# Patient Record
Sex: FEMALE | Race: WHITE | Employment: OTHER | URBAN - NONMETROPOLITAN AREA
[De-identification: names, ages, dates, MRNs, and addresses within clinical notes are randomized per-mention and may not be internally consistent; named-entity substitution may affect disease eponyms.]

---

## 2020-06-08 ENCOUNTER — TELEPHONE (OUTPATIENT)
Dept: ADMINISTRATIVE | Age: 65
End: 2020-06-08

## 2020-06-11 ENCOUNTER — VIRTUAL VISIT (OUTPATIENT)
Dept: FAMILY MEDICINE CLINIC | Age: 65
End: 2020-06-11
Payer: MEDICARE

## 2020-06-11 PROCEDURE — 4040F PNEUMOC VAC/ADMIN/RCVD: CPT | Performed by: CLINICAL NURSE SPECIALIST

## 2020-06-11 PROCEDURE — 3017F COLORECTAL CA SCREEN DOC REV: CPT | Performed by: CLINICAL NURSE SPECIALIST

## 2020-06-11 PROCEDURE — G8427 DOCREV CUR MEDS BY ELIG CLIN: HCPCS | Performed by: CLINICAL NURSE SPECIALIST

## 2020-06-11 PROCEDURE — 1123F ACP DISCUSS/DSCN MKR DOCD: CPT | Performed by: CLINICAL NURSE SPECIALIST

## 2020-06-11 PROCEDURE — 1090F PRES/ABSN URINE INCON ASSESS: CPT | Performed by: CLINICAL NURSE SPECIALIST

## 2020-06-11 PROCEDURE — G8400 PT W/DXA NO RESULTS DOC: HCPCS | Performed by: CLINICAL NURSE SPECIALIST

## 2020-06-11 PROCEDURE — 99203 OFFICE O/P NEW LOW 30 MIN: CPT | Performed by: CLINICAL NURSE SPECIALIST

## 2020-06-11 RX ORDER — ASPIRIN 81 MG/1
81 TABLET ORAL AS NEEDED
COMMUNITY

## 2020-06-11 RX ORDER — ESCITALOPRAM OXALATE 20 MG/1
20 TABLET ORAL DAILY
COMMUNITY
End: 2020-08-11 | Stop reason: SDUPTHER

## 2020-06-11 RX ORDER — NITROFURANTOIN MACROCRYSTALS 50 MG/1
50 CAPSULE ORAL PRN
COMMUNITY

## 2020-06-11 RX ORDER — ALENDRONATE SODIUM 70 MG/1
70 TABLET ORAL
COMMUNITY
End: 2020-08-11 | Stop reason: SDUPTHER

## 2020-06-11 ASSESSMENT — ENCOUNTER SYMPTOMS
EYE REDNESS: 0
SORE THROAT: 0
CHEST TIGHTNESS: 0
SHORTNESS OF BREATH: 0
EYE DISCHARGE: 0
DIARRHEA: 0
CONSTIPATION: 0
TROUBLE SWALLOWING: 0
VOMITING: 0
SINUS PRESSURE: 0
ABDOMINAL PAIN: 0
WHEEZING: 0
ABDOMINAL DISTENTION: 0
EYE PAIN: 0
BACK PAIN: 1
COUGH: 0
NAUSEA: 0
FACIAL SWELLING: 0
COLOR CHANGE: 0

## 2020-06-11 NOTE — PROGRESS NOTES
Gastrointestinal: Negative for abdominal distention, abdominal pain, constipation, diarrhea, nausea and vomiting. Endocrine: Negative for cold intolerance and heat intolerance. Genitourinary: Negative for difficulty urinating, dysuria, flank pain, frequency, hematuria and urgency. Musculoskeletal: Positive for arthralgias and back pain. Negative for joint swelling, neck pain and neck stiffness. Skin: Negative for color change and rash. Neurological: Negative for dizziness, syncope, weakness, light-headedness and headaches. Hematological: Negative for adenopathy. Does not bruise/bleed easily. Psychiatric/Behavioral: Negative for confusion, dysphoric mood and suicidal ideas. The patient is nervous/anxious. OBJECTIVE:  There were no vitals taken for this visit. Physical Exam  Vitals signs reviewed. Constitutional:       General: She is not in acute distress. Appearance: Normal appearance. She is normal weight. She is not ill-appearing or toxic-appearing. HENT:      Head: Normocephalic. Nose: Nose normal. No congestion. Neck:      Musculoskeletal: Normal range of motion. Pulmonary:      Effort: Pulmonary effort is normal. No respiratory distress. Breath sounds: No wheezing. Neurological:      General: No focal deficit present. Mental Status: She is alert and oriented to person, place, and time. Mental status is at baseline. Psychiatric:         Mood and Affect: Mood normal.         Behavior: Behavior normal.         Thought Content: Thought content normal.         Judgment: Judgment normal.         ASSESSMENT/PLAN:  1. Hyperlipidemia, unspecified hyperlipidemia type  - Lipid Panel; Future    2. Osteopenia, unspecified location  - Vitamin D 25 Hydroxy; Future    3. MANOLO (generalized anxiety disorder)    4. Arthralgia, unspecified joint    5. Fatigue, unspecified type  - CBC Auto Differential; Future  - Comprehensive Metabolic Panel; Future  - Ferritin;  Future  -

## 2020-06-12 ENCOUNTER — TELEPHONE (OUTPATIENT)
Dept: FAMILY MEDICINE CLINIC | Age: 65
End: 2020-06-12

## 2020-06-12 ENCOUNTER — TRANSCRIBE ORDERS (OUTPATIENT)
Dept: ADMINISTRATIVE | Facility: HOSPITAL | Age: 65
End: 2020-06-12

## 2020-06-12 DIAGNOSIS — Z12.31 ENCOUNTER FOR SCREENING MAMMOGRAM FOR MALIGNANT NEOPLASM OF BREAST: Primary | ICD-10-CM

## 2020-06-17 ENCOUNTER — TELEPHONE (OUTPATIENT)
Dept: FAMILY MEDICINE CLINIC | Age: 65
End: 2020-06-17

## 2020-06-22 LAB
BASOPHILS ABSOLUTE: NORMAL
BASOPHILS RELATIVE PERCENT: NORMAL
EOSINOPHILS ABSOLUTE: NORMAL
EOSINOPHILS RELATIVE PERCENT: NORMAL
HCT VFR BLD CALC: NORMAL %
HEMOGLOBIN: NORMAL
IRON: NORMAL
LYMPHOCYTES ABSOLUTE: NORMAL
LYMPHOCYTES RELATIVE PERCENT: NORMAL
MCH RBC QN AUTO: NORMAL PG
MCHC RBC AUTO-ENTMCNC: NORMAL G/DL
MCV RBC AUTO: NORMAL FL
MONOCYTES ABSOLUTE: NORMAL
MONOCYTES RELATIVE PERCENT: NORMAL
NEUTROPHILS ABSOLUTE: NORMAL
NEUTROPHILS RELATIVE PERCENT: NORMAL
PDW BLD-RTO: NORMAL %
PLATELET # BLD: NORMAL 10*3/UL
PMV BLD AUTO: NORMAL FL
RBC # BLD: NORMAL 10*6/UL
VITAMIN B-12: NORMAL
VITAMIN D 25-HYDROXY: NORMAL
VITAMIN D2, 25 HYDROXY: NORMAL
VITAMIN D3,25 HYDROXY: NORMAL
WBC # BLD: NORMAL 10*3/UL

## 2020-06-23 LAB — FERRITIN: NORMAL

## 2020-06-26 ENCOUNTER — TELEPHONE (OUTPATIENT)
Dept: FAMILY MEDICINE CLINIC | Age: 65
End: 2020-06-26

## 2020-06-26 NOTE — TELEPHONE ENCOUNTER
Janet called to reschedule a ov with pittaalexander, needs to change to fri. Please be advised that the best time to call her to accommodate their needs is Anytime. Thank you.

## 2020-07-16 ENCOUNTER — OFFICE VISIT (OUTPATIENT)
Dept: FAMILY MEDICINE CLINIC | Age: 65
End: 2020-07-16
Payer: MEDICARE

## 2020-07-16 VITALS
SYSTOLIC BLOOD PRESSURE: 118 MMHG | OXYGEN SATURATION: 98 % | HEIGHT: 66 IN | BODY MASS INDEX: 18.96 KG/M2 | DIASTOLIC BLOOD PRESSURE: 70 MMHG | HEART RATE: 69 BPM | WEIGHT: 118 LBS | TEMPERATURE: 98.9 F

## 2020-07-16 PROBLEM — D64.9 ANEMIA: Status: ACTIVE | Noted: 2020-07-16

## 2020-07-16 LAB — HGB, POC: 11.4

## 2020-07-16 PROCEDURE — 85018 HEMOGLOBIN: CPT | Performed by: CLINICAL NURSE SPECIALIST

## 2020-07-16 PROCEDURE — G8400 PT W/DXA NO RESULTS DOC: HCPCS | Performed by: CLINICAL NURSE SPECIALIST

## 2020-07-16 PROCEDURE — 4040F PNEUMOC VAC/ADMIN/RCVD: CPT | Performed by: CLINICAL NURSE SPECIALIST

## 2020-07-16 PROCEDURE — G8427 DOCREV CUR MEDS BY ELIG CLIN: HCPCS | Performed by: CLINICAL NURSE SPECIALIST

## 2020-07-16 PROCEDURE — 1036F TOBACCO NON-USER: CPT | Performed by: CLINICAL NURSE SPECIALIST

## 2020-07-16 PROCEDURE — 1090F PRES/ABSN URINE INCON ASSESS: CPT | Performed by: CLINICAL NURSE SPECIALIST

## 2020-07-16 PROCEDURE — 1123F ACP DISCUSS/DSCN MKR DOCD: CPT | Performed by: CLINICAL NURSE SPECIALIST

## 2020-07-16 PROCEDURE — 3017F COLORECTAL CA SCREEN DOC REV: CPT | Performed by: CLINICAL NURSE SPECIALIST

## 2020-07-16 PROCEDURE — 99213 OFFICE O/P EST LOW 20 MIN: CPT | Performed by: CLINICAL NURSE SPECIALIST

## 2020-07-16 PROCEDURE — G8420 CALC BMI NORM PARAMETERS: HCPCS | Performed by: CLINICAL NURSE SPECIALIST

## 2020-07-16 NOTE — PROGRESS NOTES
SUBJECTIVE:  Pete Gaona is a 72 y.o. who presents today for Follow-up and Discuss Labs      HPI    Janet presents in office today for follow up. She established care with me recently, mentioned some chronic recurrent fatigue. She seems to have had waves of fatigue for years. Currently, she will have days with plenty of energy but other days she feels like she could lay her head down and just sleep all day. Lab work up showed normal thyroid and normal iron, b12 levels. Her hgb was slightly low at 11.7 and it was 12 in December. Since Dec, she has changed her diet due to high cholesterol. She has lost some expected weight, but has lost another 4lb since May. She has not had colonoscopy, but is scheduled in September. There is no chest pain. She mentioned some thorax pain at last visit, but that has resolved. No exertional fatigue noted. Hemoglobin today is 11.4    Past Medical History:   Diagnosis Date    Arthralgia     MANOLO (generalized anxiety disorder)     MANOLO (generalized anxiety disorder)     Hyperlipidemia     Osteopenia      Past Surgical History:   Procedure Laterality Date    HYSTERECTOMY  2010    LUMBAR DISCECTOMY  2015    LUMBAR LAMINECTOMY  2014    OVARIAN CYST REMOVAL  1978     Family History   Problem Relation Age of Onset    Coronary Art Dis Mother     Prostate Cancer Father     Coronary Art Dis Father     Diabetes type 2  Father     Arthritis Sister     Glaucoma Sister      Social History     Tobacco Use    Smoking status: Never Smoker    Smokeless tobacco: Never Used   Substance Use Topics    Alcohol use:  Yes     Alcohol/week: 2.0 standard drinks     Types: 2 Glasses of wine per week     Current Outpatient Medications   Medication Sig Dispense Refill    escitalopram (LEXAPRO) 20 MG tablet Take 20 mg by mouth daily      alendronate (FOSAMAX) 70 MG tablet Take 70 mg by mouth every 7 days      nitrofurantoin (MACRODANTIN) 50 MG capsule Take 50 mg by mouth as needed      Emollient (RA MOISTURIZING CREAM EX) Apply 1 Dose topically daily      Multiple Vitamins-Minerals (CENTRUM SILVER 50+WOMEN PO) Take 1 tablet by mouth daily      vitamin D (CHOLECALCIFEROL) 25 MCG (1000 UT) TABS tablet Take 1,000 Units by mouth daily      Misc Natural Products (GLUCOSAMINE CHOND COMPLEX/MSM PO) Take 1 tablet by mouth 2 times daily      aspirin 81 MG EC tablet Take 81 mg by mouth daily      LUTEIN PO Take 1 tablet by mouth daily      Hyaluronic Acid-Vitamin C (HYALURONIC ACID PO) Take 1 tablet by mouth daily       No current facility-administered medications for this visit. No Known Allergies    Review of Systems   Constitutional: Positive for fatigue and unexpected weight change. Negative for appetite change, chills, diaphoresis and fever. HENT: Negative for congestion, ear pain, facial swelling, hearing loss, postnasal drip, sinus pressure, sore throat and trouble swallowing. Eyes: Negative for pain, discharge, redness and visual disturbance. Respiratory: Negative for cough, chest tightness, shortness of breath and wheezing. Cardiovascular: Negative for chest pain and palpitations. Gastrointestinal: Negative for abdominal pain, anal bleeding, blood in stool, constipation and diarrhea. Endocrine: Negative for cold intolerance and heat intolerance. Genitourinary: Negative for difficulty urinating, dysuria, flank pain, frequency, hematuria and urgency. Musculoskeletal: Positive for arthralgias. Negative for back pain, joint swelling and neck pain. Skin: Negative for color change and rash. Neurological: Negative for dizziness, syncope, weakness, light-headedness and headaches. Hematological: Negative for adenopathy. Does not bruise/bleed easily. Psychiatric/Behavioral: Negative for confusion, dysphoric mood and suicidal ideas. The patient is not nervous/anxious.          OBJECTIVE:  /70   Pulse 69   Temp 98.9 °F (37.2 °C) (Temporal)   Ht 5' 5.5\" (1.664 m)   Wt 118 lb (53.5 kg)   SpO2 98%   BMI 19.34 kg/m²    Physical Exam  Vitals signs reviewed. Constitutional:       General: She is not in acute distress. Appearance: Normal appearance. She is well-developed. She is not ill-appearing or toxic-appearing. HENT:      Head: Normocephalic and atraumatic. Eyes:      General:         Right eye: No discharge. Left eye: No discharge. Conjunctiva/sclera: Conjunctivae normal.      Pupils: Pupils are equal, round, and reactive to light. Neck:      Musculoskeletal: Normal range of motion and neck supple. Thyroid: No thyromegaly. Trachea: No tracheal deviation. Cardiovascular:      Rate and Rhythm: Normal rate and regular rhythm. Heart sounds: No murmur. Pulmonary:      Effort: Pulmonary effort is normal. No respiratory distress. Breath sounds: Normal breath sounds. No wheezing or rales. Abdominal:      General: Bowel sounds are normal. There is no distension. Palpations: Abdomen is soft. Tenderness: There is no abdominal tenderness. There is no rebound. Genitourinary:     Vagina: Normal. No vaginal discharge. Musculoskeletal: Normal range of motion. General: No deformity. Lymphadenopathy:      Cervical: No cervical adenopathy. Skin:     General: Skin is warm and dry. Findings: No erythema or rash. Neurological:      General: No focal deficit present. Mental Status: She is alert and oriented to person, place, and time. Mental status is at baseline. Motor: No weakness. Psychiatric:         Mood and Affect: Mood normal.         Behavior: Behavior normal.         Thought Content: Thought content normal.         Judgment: Judgment normal.         ASSESSMENT/PLAN:  1. Chronic fatigue    2. Anemia, unspecified type  - POCT hemoglobin  - CBC Auto Differential; Future    Work up for fatigue negative other than a mild anemia but normal iron, b12 and ferritin.   Recommend she add iron to her MVI once daily with food.  hgb slightly lower today. Needs the colonoscopy as planned. If worsening fatigue, get CBC between now and colonoscopy. Also recommend she monitor blood pressure and heart rate on days she is symptomatic. If any exertional symptoms or pain, needs cardiac work up. Return in about 3 months (around 10/16/2020) for Annual Wellness Visit.

## 2020-07-17 ENCOUNTER — HOSPITAL ENCOUNTER (OUTPATIENT)
Dept: MAMMOGRAPHY | Facility: HOSPITAL | Age: 65
Discharge: HOME | End: 2020-07-17
Admitting: CLINICAL NURSE SPECIALIST

## 2020-07-17 DIAGNOSIS — Z12.31 ENCOUNTER FOR SCREENING MAMMOGRAM FOR MALIGNANT NEOPLASM OF BREAST: ICD-10-CM

## 2020-07-17 PROCEDURE — 77067 SCR MAMMO BI INCL CAD: CPT

## 2020-07-17 PROCEDURE — 77063 BREAST TOMOSYNTHESIS BI: CPT

## 2020-07-17 ASSESSMENT — ENCOUNTER SYMPTOMS
SHORTNESS OF BREATH: 0
TROUBLE SWALLOWING: 0
FACIAL SWELLING: 0
BLOOD IN STOOL: 0
COLOR CHANGE: 0
ABDOMINAL PAIN: 0
COUGH: 0
EYE PAIN: 0
CHEST TIGHTNESS: 0
CONSTIPATION: 0
EYE DISCHARGE: 0
BACK PAIN: 0
EYE REDNESS: 0
DIARRHEA: 0
ANAL BLEEDING: 0
SORE THROAT: 0
WHEEZING: 0
SINUS PRESSURE: 0

## 2020-07-22 DIAGNOSIS — Z12.31 ENCOUNTER FOR SCREENING MAMMOGRAM FOR MALIGNANT NEOPLASM OF BREAST: ICD-10-CM

## 2020-07-23 ENCOUNTER — TELEPHONE (OUTPATIENT)
Dept: FAMILY MEDICINE CLINIC | Age: 65
End: 2020-07-23

## 2020-07-23 NOTE — TELEPHONE ENCOUNTER
Patient called again this morning anxiously awaiting her test results. Please call her @ [phone]. She left a message yesterday with Violet Andrade  Thanks

## 2020-08-04 ENCOUNTER — TELEPHONE (OUTPATIENT)
Dept: FAMILY MEDICINE CLINIC | Age: 65
End: 2020-08-04

## 2020-08-04 NOTE — TELEPHONE ENCOUNTER
She should be ok as long as not using the ointment on the day of testing. They also can sometimes do a throat test, but the nasal swab actually goes back into the nasopharynx so should bypass any area ointment may have been. Recommend flu clinic for COVID testing if symptomatic.

## 2020-08-18 ENCOUNTER — TELEPHONE (OUTPATIENT)
Dept: ADMINISTRATIVE | Age: 65
End: 2020-08-18

## 2020-08-18 RX ORDER — ALENDRONATE SODIUM 70 MG/1
70 TABLET ORAL
Qty: 12 TABLET | Refills: 3 | Status: SHIPPED | OUTPATIENT
Start: 2020-08-18 | End: 2021-06-29 | Stop reason: SDUPTHER

## 2020-08-18 RX ORDER — ESCITALOPRAM OXALATE 20 MG/1
20 TABLET ORAL DAILY
Qty: 90 TABLET | Refills: 3 | Status: SHIPPED | OUTPATIENT
Start: 2020-08-18 | End: 2021-05-21 | Stop reason: SDUPTHER

## 2020-08-18 NOTE — TELEPHONE ENCOUNTER
Patient requested to have her scripts resent to [facility] instead of the [facility] in [place]. Scripts sent.

## 2020-08-18 NOTE — TELEPHONE ENCOUNTER
Janet called stating that their medication send to a different pharmacy Please call her to advise. Prescriptions was sent to wrong pharmacy.  Pt wants prescriptions sent to the [facility] in [facility]:  Visit date not found  Next Appt:   Visit date not found  Preferred Pharmacy: [facility] in [place]

## 2020-09-20 ENCOUNTER — OFFICE VISIT (OUTPATIENT)
Age: 65
End: 2020-09-20

## 2020-09-20 VITALS — TEMPERATURE: 98 F | HEART RATE: 70 BPM | OXYGEN SATURATION: 98 %

## 2020-09-20 PROCEDURE — 99999 PR OFFICE/OUTPT VISIT,PROCEDURE ONLY: CPT | Performed by: PHYSICIAN ASSISTANT

## 2020-09-22 LAB — SARS-COV-2, NAA: NOT DETECTED

## 2020-09-23 ENCOUNTER — ANESTHESIA EVENT (OUTPATIENT)
Dept: OPERATING ROOM | Age: 65
End: 2020-09-23

## 2020-09-24 ENCOUNTER — HOSPITAL ENCOUNTER (OUTPATIENT)
Age: 65
Setting detail: OUTPATIENT SURGERY
Discharge: HOME | End: 2020-09-24
Attending: INTERNAL MEDICINE | Admitting: INTERNAL MEDICINE
Payer: MEDICARE

## 2020-09-24 ENCOUNTER — ANESTHESIA (OUTPATIENT)
Dept: OPERATING ROOM | Age: 65
End: 2020-09-24

## 2020-09-24 ENCOUNTER — APPOINTMENT (OUTPATIENT)
Dept: OPERATING ROOM | Age: 65
End: 2020-09-24

## 2020-09-24 VITALS
HEIGHT: 66 IN | RESPIRATION RATE: 20 BRPM | SYSTOLIC BLOOD PRESSURE: 90 MMHG | HEART RATE: 59 BPM | WEIGHT: 122 LBS | BODY MASS INDEX: 19.61 KG/M2 | TEMPERATURE: 97.1 F | DIASTOLIC BLOOD PRESSURE: 48 MMHG | OXYGEN SATURATION: 98 %

## 2020-09-24 VITALS — DIASTOLIC BLOOD PRESSURE: 49 MMHG | SYSTOLIC BLOOD PRESSURE: 92 MMHG | OXYGEN SATURATION: 99 %

## 2020-09-24 PROCEDURE — G8907 PT DOC NO EVENTS ON DISCHARG: HCPCS

## 2020-09-24 PROCEDURE — G8918 PT W/O PREOP ORDER IV AB PRO: HCPCS

## 2020-09-24 PROCEDURE — G0105 COLORECTAL SCRN; HI RISK IND: HCPCS

## 2020-09-24 PROCEDURE — G0105 COLORECTAL SCRN; HI RISK IND: HCPCS | Performed by: INTERNAL MEDICINE

## 2020-09-24 RX ORDER — LIDOCAINE HYDROCHLORIDE 10 MG/ML
INJECTION, SOLUTION INFILTRATION; PERINEURAL PRN
Status: DISCONTINUED | OUTPATIENT
Start: 2020-09-24 | End: 2020-09-24 | Stop reason: SDUPTHER

## 2020-09-24 RX ORDER — PROPOFOL 10 MG/ML
INJECTION, EMULSION INTRAVENOUS PRN
Status: DISCONTINUED | OUTPATIENT
Start: 2020-09-24 | End: 2020-09-24 | Stop reason: SDUPTHER

## 2020-09-24 RX ORDER — SODIUM CHLORIDE 9 MG/ML
INJECTION, SOLUTION INTRAVENOUS CONTINUOUS
Status: DISCONTINUED | OUTPATIENT
Start: 2020-09-24 | End: 2020-09-24 | Stop reason: HOSPADM

## 2020-09-24 RX ADMIN — LIDOCAINE HYDROCHLORIDE 40 MG: 10 INJECTION, SOLUTION INFILTRATION; PERINEURAL at 09:05

## 2020-09-24 RX ADMIN — SODIUM CHLORIDE: 9 INJECTION, SOLUTION INTRAVENOUS at 08:11

## 2020-09-24 RX ADMIN — PROPOFOL 230 MG: 10 INJECTION, EMULSION INTRAVENOUS at 09:05

## 2020-09-24 NOTE — H&P
Patient Name: French Pina  : 1955  MRN: [id number]  DATE: 20    Allergies: No Known Allergies     ENDOSCOPY  History and Physical    Procedure:    [] Diagnostic Colonoscopy       [x] Screening Colonoscopy  [] EGD      [] ERCP      [] EUS       [] Other    [x] Previous office notes/History and Physical reviewed from the patients chart. Please see EMR for further details of HPI. I have examined the patient's status immediately prior to the procedure and:      Indications/HPI:       [x] Screening              [] History of Polyps      [x]Fhx of colon CA/polyps       Anesthesia:   [x] MAC [] Moderate Sedation   [] General   [] None     ROS: 12 pt review of systems was negative unless stated above    Medications:   Prior to Admission medications    Medication Sig Start Date End Date Taking?  Authorizing Provider   alendronate (FOSAMAX) 70 MG tablet Take 1 tablet by mouth every 7 days 20   CHARLY Mortensen   escitalopram (LEXAPRO) 20 MG tablet Take 1 tablet by mouth daily 20   CHARLY Elizabeth   nitrofurantoin (MACRODANTIN) 50 MG capsule Take 50 mg by mouth as needed    Historical Provider, MD   Emollient (RA MOISTURIZING CREAM EX) Apply 1 Dose topically daily    Historical Provider, MD   Multiple Vitamins-Minerals (CENTRUM SILVER 50+WOMEN PO) Take 1 tablet by mouth daily    Historical Provider, MD   vitamin D (CHOLECALCIFEROL) 25 MCG (1000 UT) TABS tablet Take 1,000 Units by mouth daily    Historical Provider, MD   Misc Natural Products (GLUCOSAMINE CHOND COMPLEX/MSM PO) Take 1 tablet by mouth 2 times daily    Historical Provider, MD   aspirin 81 MG EC tablet Take 81 mg by mouth daily    Historical Provider, MD   LUTEIN PO Take 1 tablet by mouth daily    Historical Provider, MD   Hyaluronic Acid-Vitamin C (HYALURONIC ACID PO) Take 1 tablet by mouth daily    Historical Provider, MD       Past Medical History:  Past Medical History:   Diagnosis Date    Arthralgia     MANOLO (generalized anxiety disorder)     MANOLO (generalized anxiety disorder)     Hyperlipidemia     Osteopenia        Past Surgical History:  Past Surgical History:   Procedure Laterality Date    HYSTERECTOMY  2010    LUMBAR DISCECTOMY  2015    LUMBAR LAMINECTOMY  2014    OVARIAN CYST REMOVAL  1978       Social History:  Social History     Tobacco Use    Smoking status: Never Smoker    Smokeless tobacco: Never Used   Substance Use Topics    Alcohol use: Yes     Alcohol/week: 2.0 standard drinks     Types: 2 Glasses of wine per week    Drug use: Never       Vital Signs: There were no vitals filed for this visit. Physical Exam:  Cardiac:  [x]WNL  []Comments:  Pulmonary:  [x]WNL   []Comments:  Neuro/Mental Status:  [x]WNL  []Comments:  Abdominal:  [x]WNL    []Comments:  Other:   []WNL  []Comments:    Informed Consent:  The risks and benefits of the procedure have been discussed with either the patient or if they cannot consent, their representative. Assessment:  Patient examined and appropriate for planned sedation and procedure. Plan:  Proceed with planned sedation and procedure as above. Shani Valdes am scribing for and in the presence of Dr. Buddy Schaefer MD.  Electronically signed by Stan Yoo RN on 9/24/2020 at 7:55 AM    I personally performed the services described in this documentation as scribed by [facility], and it appears accurate and complete.      Buddy Schaefer MD  9/24/2020

## 2020-09-24 NOTE — ANESTHESIA PRE PROCEDURE
Department of Anesthesiology  Preprocedure Note       Name:  Patti Guzman   Age:  72 y.o.  :  1955                                          MRN:  [id number]         Date:  2020      Surgeon: Layo De Paz):  Dayna Woo MD    Procedure: Procedure(s):  COLORECTAL CANCER SCREENING, NOT HIGH RISK    Medications prior to admission:   Prior to Admission medications    Medication Sig Start Date End Date Taking?  Authorizing Provider   alendronate (FOSAMAX) 70 MG tablet Take 1 tablet by mouth every 7 days 20   Selena Miners APRN   escitalopram (LEXAPRO) 20 MG tablet Take 1 tablet by mouth daily 20   Selena Miners, APRN   nitrofurantoin (MACRODANTIN) 50 MG capsule Take 50 mg by mouth as needed    Historical Provider, MD   Emollient (RA MOISTURIZING CREAM EX) Apply 1 Dose topically daily    Historical Provider, MD   Multiple Vitamins-Minerals (CENTRUM SILVER 50+WOMEN PO) Take 1 tablet by mouth daily    Historical Provider, MD   vitamin D (CHOLECALCIFEROL) 25 MCG (1000 UT) TABS tablet Take 1,000 Units by mouth daily    Historical Provider, MD   Misc Natural Products (GLUCOSAMINE CHOND COMPLEX/MSM PO) Take 1 tablet by mouth 2 times daily    Historical Provider, MD   aspirin 81 MG EC tablet Take 81 mg by mouth daily    Historical Provider, MD   LUTEIN PO Take 1 tablet by mouth daily    Historical Provider, MD   Hyaluronic Acid-Vitamin C (HYALURONIC ACID PO) Take 1 tablet by mouth daily    Historical Provider, MD       Current medications:    Current Facility-Administered Medications   Medication Dose Route Frequency Provider Last Rate Last Dose    0.9 % sodium chloride infusion   Intravenous Continuous Dayna Woo  mL/hr at 20 6870         Allergies:  No Known Allergies    Problem List:    Patient Active Problem List   Diagnosis Code    Hyperlipidemia E78.5    Osteopenia M85.80    MANOLO (generalized anxiety disorder) F41.1    Arthralgia M25.50    Anemia D64.9       Past Medical History:        Diagnosis Date    Arthralgia     MANOLO (generalized anxiety disorder)     MANOLO (generalized anxiety disorder)     Hyperlipidemia     Osteopenia        Past Surgical History:        Procedure Laterality Date    HYSTERECTOMY  2010    LUMBAR DISCECTOMY  2015    LUMBAR LAMINECTOMY  2014    OVARIAN CYST REMOVAL  1978       Social History:    Social History     Tobacco Use    Smoking status: Never Smoker    Smokeless tobacco: Never Used   Substance Use Topics    Alcohol use: Yes     Alcohol/week: 2.0 standard drinks     Types: 2 Glasses of wine per week                                Counseling given: Not Answered      Vital Signs (Current):   Vitals:    09/24/20 0805   BP: 110/67   Pulse: 62   Resp: 20   Temp: 97.1 °F (36.2 °C)   TempSrc: Temporal   SpO2: 99%   Weight: 122 lb (55.3 kg)   Height: 5' 5.5\" (1.664 m)                                              BP Readings from Last 3 Encounters:   09/24/20 110/67   07/16/20 118/70   08/14/16 108/66       NPO Status: Time of last liquid consumption: 0230                        Time of last solid consumption: 1800                        Date of last liquid consumption: 09/24/20                        Date of last solid food consumption: 09/22/20    BMI:   Wt Readings from Last 3 Encounters:   09/24/20 122 lb (55.3 kg)   07/16/20 118 lb (53.5 kg)   08/14/16 133 lb 9.6 oz (60.6 kg)     Body mass index is 19.99 kg/m². CBC:   Lab Results   Component Value Date    HGB 11.4 07/16/2020       CMP: No results found for: NA, K, CL, CO2, BUN, CREATININE, GFRAA, AGRATIO, LABGLOM, GLUCOSE, PROT, CALCIUM, BILITOT, ALKPHOS, AST, ALT    POC Tests: No results for input(s): POCGLU, POCNA, POCK, POCCL, POCBUN, POCHEMO, POCHCT in the last 72 hours.     Coags: No results found for: PROTIME, INR, APTT    HCG (If Applicable): No results found for: PREGTESTUR, PREGSERUM, HCG, HCGQUANT     ABGs: No results found for: PHART, PO2ART, APH2VGH, FAR4CGR, BEART, D3CRTVES     Type & Screen (If Applicable):  No results found for: LABABO, LABRH    Drug/Infectious Status (If Applicable):  No results found for: HIV, HEPCAB    COVID-19 Screening (If Applicable):   Lab Results   Component Value Date    COVID19 NOT DETECTED 09/20/2020         Anesthesia Evaluation  Patient summary reviewed and Nursing notes reviewed no history of anesthetic complications:   Airway: Mallampati: II  TM distance: >3 FB   Neck ROM: full  Mouth opening: < 3 FB Dental: normal exam         Pulmonary:Negative Pulmonary ROS and normal exam                               Cardiovascular:    (+) hyperlipidemia         Beta Blocker:  Not on Beta Blocker         Neuro/Psych:   (+) depression/anxiety             GI/Hepatic/Renal:            ROS comment: etoh use. Endo/Other:    (+) blood dyscrasia::., .                 Abdominal:           Vascular: negative vascular ROS. Anesthesia Plan      general and TIVA     ASA 2       Induction: intravenous. Anesthetic plan and risks discussed with patient.                       CHARLY Arenas - CRNA   9/24/2020

## 2020-09-24 NOTE — ANESTHESIA POSTPROCEDURE EVALUATION
Department of Anesthesiology  Postprocedure Note    Patient: Jc Dorman  MRN: [id number]  YOB: 1955  Date of evaluation: 9/24/2020  Time:  9:20 AM     Procedure Summary     Date:  09/24/20 Room / Location:  [facility] ENDO 02 / [address]    Anesthesia Start:  0900 Anesthesia Stop:      Procedure:  COLORECTAL CANCER SCREENING, NOT HIGH RISK (N/A Abdomen) Diagnosis:  (536753)    Surgeon:  Dorene Palacios MD Responsible Provider:  CHARLY Aguilera CRNA    Anesthesia Type:  general, TIVA ASA Status:  2          Anesthesia Type: general, TIVA    Simon Phase I:      Simon Phase II:      Last vitals: Reviewed and per EMR flowsheets.        Anesthesia Post Evaluation    Patient location during evaluation: bedside  Patient participation: complete - patient participated  Level of consciousness: sleepy but conscious  Pain score: 0  Airway patency: patent  Nausea & Vomiting: no nausea and no vomiting  Complications: no  Cardiovascular status: hemodynamically stable and blood pressure returned to baseline  Respiratory status: acceptable and nasal cannula  Hydration status: stable

## 2020-09-25 PROCEDURE — G0105 COLORECTAL SCRN; HI RISK IND: HCPCS

## 2020-10-15 ENCOUNTER — OFFICE VISIT (OUTPATIENT)
Dept: FAMILY MEDICINE CLINIC | Age: 65
End: 2020-10-15
Payer: MEDICARE

## 2020-10-15 VITALS
TEMPERATURE: 97.7 F | BODY MASS INDEX: 18.74 KG/M2 | WEIGHT: 116.6 LBS | OXYGEN SATURATION: 98 % | SYSTOLIC BLOOD PRESSURE: 106 MMHG | DIASTOLIC BLOOD PRESSURE: 64 MMHG | HEART RATE: 70 BPM | HEIGHT: 66 IN

## 2020-10-15 DIAGNOSIS — D64.9 ANEMIA, UNSPECIFIED TYPE: ICD-10-CM

## 2020-10-15 DIAGNOSIS — Z72.89 OTHER PROBLEMS RELATED TO LIFESTYLE: ICD-10-CM

## 2020-10-15 DIAGNOSIS — Z11.4 SCREENING FOR HIV WITHOUT PRESENCE OF RISK FACTORS: ICD-10-CM

## 2020-10-15 LAB
BASOPHILS ABSOLUTE: 0 K/UL (ref 0–0.2)
BASOPHILS RELATIVE PERCENT: 0.6 % (ref 0–1)
EOSINOPHILS ABSOLUTE: 0.2 K/UL (ref 0–0.6)
EOSINOPHILS RELATIVE PERCENT: 3.4 % (ref 0–5)
FERRITIN: 118.5 NG/ML (ref 13–150)
FOLATE: >20 NG/ML (ref 4.8–37.3)
HCT VFR BLD CALC: 38.2 % (ref 37–47)
HEMOGLOBIN: 12.6 G/DL (ref 12–16)
HEPATITIS C ANTIBODY INTERPRETATION: NORMAL
HIV-1 P24 AG: NORMAL
IMMATURE GRANULOCYTES #: 0 K/UL
IRON: 56 UG/DL (ref 37–145)
LYMPHOCYTES ABSOLUTE: 1.6 K/UL (ref 1.1–4.5)
LYMPHOCYTES RELATIVE PERCENT: 30.9 % (ref 20–40)
MCH RBC QN AUTO: 30.1 PG (ref 27–31)
MCHC RBC AUTO-ENTMCNC: 33 G/DL (ref 33–37)
MCV RBC AUTO: 91.2 FL (ref 81–99)
MONOCYTES ABSOLUTE: 0.4 K/UL (ref 0–0.9)
MONOCYTES RELATIVE PERCENT: 8 % (ref 0–10)
NEUTROPHILS ABSOLUTE: 2.8 K/UL (ref 1.5–7.5)
NEUTROPHILS RELATIVE PERCENT: 56.7 % (ref 50–65)
PDW BLD-RTO: 13.1 % (ref 11.5–14.5)
PLATELET # BLD: 362 K/UL (ref 130–400)
PMV BLD AUTO: 8.7 FL (ref 9.4–12.3)
RAPID HIV 1&2: NORMAL
RBC # BLD: 4.19 M/UL (ref 4.2–5.4)
T4 FREE: 0.82 NG/DL (ref 0.93–1.7)
TSH SERPL DL<=0.05 MIU/L-ACNC: 4.35 UIU/ML (ref 0.27–4.2)
VITAMIN B-12: 1139 PG/ML (ref 211–946)
WBC # BLD: 5 K/UL (ref 4.8–10.8)

## 2020-10-15 PROCEDURE — G0402 INITIAL PREVENTIVE EXAM: HCPCS | Performed by: CLINICAL NURSE SPECIALIST

## 2020-10-15 PROCEDURE — 3017F COLORECTAL CA SCREEN DOC REV: CPT | Performed by: CLINICAL NURSE SPECIALIST

## 2020-10-15 PROCEDURE — 1123F ACP DISCUSS/DSCN MKR DOCD: CPT | Performed by: CLINICAL NURSE SPECIALIST

## 2020-10-15 PROCEDURE — G0403 EKG FOR INITIAL PREVENT EXAM: HCPCS | Performed by: CLINICAL NURSE SPECIALIST

## 2020-10-15 PROCEDURE — 4040F PNEUMOC VAC/ADMIN/RCVD: CPT | Performed by: CLINICAL NURSE SPECIALIST

## 2020-10-15 RX ORDER — PRASTERONE (DHEA) 25 MG
1 CAPSULE ORAL DAILY
COMMUNITY

## 2020-10-15 RX ORDER — PETROLATUM,WHITE/LANOLIN
1 OINTMENT (GRAM) TOPICAL 2 TIMES DAILY
COMMUNITY

## 2020-10-15 RX ORDER — MAGNESIUM 200 MG
200 TABLET ORAL 2 TIMES DAILY
COMMUNITY

## 2020-10-15 ASSESSMENT — PATIENT HEALTH QUESTIONNAIRE - PHQ9
SUM OF ALL RESPONSES TO PHQ QUESTIONS 1-9: 0
SUM OF ALL RESPONSES TO PHQ9 QUESTIONS 1 & 2: 0
SUM OF ALL RESPONSES TO PHQ QUESTIONS 1-9: 0
1. LITTLE INTEREST OR PLEASURE IN DOING THINGS: 0
2. FEELING DOWN, DEPRESSED OR HOPELESS: 0
SUM OF ALL RESPONSES TO PHQ QUESTIONS 1-9: 0

## 2020-10-15 ASSESSMENT — LIFESTYLE VARIABLES
HOW MANY STANDARD DRINKS CONTAINING ALCOHOL DO YOU HAVE ON A TYPICAL DAY: 0
HOW OFTEN DO YOU HAVE SIX OR MORE DRINKS ON ONE OCCASION: 0
HOW OFTEN DO YOU HAVE A DRINK CONTAINING ALCOHOL: 4
AUDIT-C TOTAL SCORE: 4

## 2020-10-15 NOTE — PROGRESS NOTES
MANOLO (generalized anxiety disorder)     Hyperlipidemia     Osteopenia        Past Surgical History:   Procedure Laterality Date    COLONOSCOPY N/A 9/24/2020    Dr Lena Lopez, 10 yr recall    HYSTERECTOMY  2010    LUMBAR DISCECTOMY  2015    LUMBAR LAMINECTOMY  2014    OVARIAN CYST REMOVAL  1978         Family History   Problem Relation Age of Onset    Coronary Art Dis Mother     Prostate Cancer Father     Coronary Art Dis Father     Diabetes type 2  Father     Arthritis Sister     Glaucoma Sister        CareTeam (Including outside providers/suppliers regularly involved in providing care):   Patient Care Team:  CHARLY Sotelo as PCP - General (Clinical Nurse Specialist Adult Health)  CHARLY Sotelo as PCP - [facility] Empaneled Provider    Wt Readings from Last 3 Encounters:   10/15/20 116 lb 9.6 oz (52.9 kg)   09/24/20 122 lb (55.3 kg)   07/16/20 118 lb (53.5 kg)     Vitals:    10/15/20 1528   BP: 106/64   Pulse: 70   Temp: 97.7 °F (36.5 °C)   TempSrc: Temporal   SpO2: 98%   Weight: 116 lb 9.6 oz (52.9 kg)   Height: 5' 5.5\" (1.664 m)     Body mass index is 19.11 kg/m². Based upon direct observation of the patient, evaluation of cognition reveals recent and remote memory intact. Patient's complete Health Risk Assessment and screening values have been reviewed and are found in Flowsheets. The following problems were reviewed today and where indicated follow up appointments were made and/or referrals ordered. Positive Risk Factor Screenings with Interventions:       General Health and ACP:  General  In general, how would you say your health is?: Very Good  In the past 7 days, have you experienced any of the following?  New or Increased Pain, New or Increased Fatigue, Loneliness, Social Isolation, Stress or Anger?: None of These  Do you get the social and emotional support that you need?: Yes  Do you have a Living Will?: (!) No  Advance Directives     Power of [facility] Will

## 2020-10-15 NOTE — PATIENT INSTRUCTIONS
Personalized Preventive Plan for Emani Dougherty - 10/15/2020  Medicare offers a range of preventive health benefits. Some of the tests and screenings are paid in full while other may be subject to a deductible, co-insurance, and/or copay. Some of these benefits include a comprehensive review of your medical history including lifestyle, illnesses that may run in your family, and various assessments and screenings as appropriate. After reviewing your medical record and screening and assessments performed today your provider may have ordered immunizations, labs, imaging, and/or referrals for you. A list of these orders (if applicable) as well as your Preventive Care list are included within your After Visit Summary for your review. Other Preventive Recommendations:    · A preventive eye exam performed by an eye specialist is recommended every 1-2 years to screen for glaucoma; cataracts, macular degeneration, and other eye disorders. · A preventive dental visit is recommended every 6 months. · Try to get at least 150 minutes of exercise per week or 10,000 steps per day on a pedometer . · Order or download the FREE \"Exercise & Physical Activity: Your Everyday Guide\" from [employer]. Call 6-612.363.6985 or search [employer] online. · You need 6503-9108 mg of calcium and 6750-5285 IU of vitamin D per day. It is possible to meet your calcium requirement with diet alone, but a vitamin D supplement is usually necessary to meet this goal.  · When exposed to the sun, use a sunscreen that protects against both UVA and UVB radiation with an SPF of 30 or greater. Reapply every 2 to 3 hours or after sweating, drying off with a towel, or swimming. · Always wear a seat belt when traveling in a car. Always wear a helmet when riding a bicycle or motorcycle.

## 2020-10-16 ASSESSMENT — VISUAL ACUITY
OS_CC: 20/25
OD_CC: 20/25

## 2020-10-20 ENCOUNTER — TELEPHONE (OUTPATIENT)
Dept: FAMILY MEDICINE CLINIC | Age: 65
End: 2020-10-20

## 2020-10-26 ENCOUNTER — HOSPITAL ENCOUNTER (OUTPATIENT)
Dept: CT IMAGING | Age: 65
Discharge: HOME | End: 2020-10-26
Payer: MEDICARE

## 2020-10-26 ENCOUNTER — HOSPITAL ENCOUNTER (OUTPATIENT)
Dept: WOMENS IMAGING | Age: 65
Discharge: HOME | End: 2020-10-26
Payer: MEDICARE

## 2020-10-26 LAB
GFR AFRICAN AMERICAN: >60
GFR NON-AFRICAN AMERICAN: >60
PERFORMED ON: NORMAL
POC CREATININE: 0.8 MG/DL (ref 0.3–1.3)
POC SAMPLE TYPE: NORMAL

## 2020-10-26 PROCEDURE — 82565 ASSAY OF CREATININE: CPT

## 2020-10-26 PROCEDURE — 77080 DXA BONE DENSITY AXIAL: CPT

## 2020-10-26 PROCEDURE — 74177 CT ABD & PELVIS W/CONTRAST: CPT

## 2020-10-26 PROCEDURE — 6360000004 HC RX CONTRAST MEDICATION: Performed by: CLINICAL NURSE SPECIALIST

## 2020-10-26 RX ADMIN — IOPAMIDOL 90 ML: 755 INJECTION, SOLUTION INTRAVENOUS at 15:18

## 2021-04-26 ENCOUNTER — OFFICE VISIT (OUTPATIENT)
Dept: PRIMARY CARE CLINIC | Age: 66
End: 2021-04-26
Payer: MEDICARE

## 2021-04-26 VITALS
WEIGHT: 124 LBS | HEIGHT: 66 IN | SYSTOLIC BLOOD PRESSURE: 102 MMHG | BODY MASS INDEX: 19.93 KG/M2 | OXYGEN SATURATION: 97 % | DIASTOLIC BLOOD PRESSURE: 70 MMHG | HEART RATE: 65 BPM | RESPIRATION RATE: 16 BRPM | TEMPERATURE: 97.6 F

## 2021-04-26 DIAGNOSIS — E78.5 HYPERLIPIDEMIA, UNSPECIFIED HYPERLIPIDEMIA TYPE: ICD-10-CM

## 2021-04-26 DIAGNOSIS — M19.90 ARTHRITIS: Primary | ICD-10-CM

## 2021-04-26 DIAGNOSIS — M25.50 ARTHRALGIA, UNSPECIFIED JOINT: ICD-10-CM

## 2021-04-26 DIAGNOSIS — M54.9 MID BACK PAIN: ICD-10-CM

## 2021-04-26 DIAGNOSIS — Z00.00 WELLNESS EXAMINATION: ICD-10-CM

## 2021-04-26 PROCEDURE — G8420 CALC BMI NORM PARAMETERS: HCPCS | Performed by: FAMILY MEDICINE

## 2021-04-26 PROCEDURE — 3017F COLORECTAL CA SCREEN DOC REV: CPT | Performed by: FAMILY MEDICINE

## 2021-04-26 PROCEDURE — 1090F PRES/ABSN URINE INCON ASSESS: CPT | Performed by: FAMILY MEDICINE

## 2021-04-26 PROCEDURE — 99214 OFFICE O/P EST MOD 30 MIN: CPT | Performed by: FAMILY MEDICINE

## 2021-04-26 PROCEDURE — G8399 PT W/DXA RESULTS DOCUMENT: HCPCS | Performed by: FAMILY MEDICINE

## 2021-04-26 PROCEDURE — 4040F PNEUMOC VAC/ADMIN/RCVD: CPT | Performed by: FAMILY MEDICINE

## 2021-04-26 PROCEDURE — G8427 DOCREV CUR MEDS BY ELIG CLIN: HCPCS | Performed by: FAMILY MEDICINE

## 2021-04-26 PROCEDURE — 1036F TOBACCO NON-USER: CPT | Performed by: FAMILY MEDICINE

## 2021-04-26 PROCEDURE — 1123F ACP DISCUSS/DSCN MKR DOCD: CPT | Performed by: FAMILY MEDICINE

## 2021-04-26 RX ORDER — PRENATAL VIT 91/IRON/FOLIC/DHA 28-975-200
COMBINATION PACKAGE (EA) ORAL 2 TIMES DAILY
COMMUNITY
End: 2022-05-10

## 2021-04-26 RX ORDER — BIOTIN 10 MG
10 TABLET ORAL DAILY
COMMUNITY

## 2021-04-26 RX ORDER — ACETAMINOPHEN 160 MG
TABLET,DISINTEGRATING ORAL
COMMUNITY

## 2021-04-26 ASSESSMENT — PATIENT HEALTH QUESTIONNAIRE - PHQ9
SUM OF ALL RESPONSES TO PHQ QUESTIONS 1-9: 0
SUM OF ALL RESPONSES TO PHQ QUESTIONS 1-9: 0
2. FEELING DOWN, DEPRESSED OR HOPELESS: 0
SUM OF ALL RESPONSES TO PHQ9 QUESTIONS 1 & 2: 0

## 2021-04-26 NOTE — PROGRESS NOTES
SUBJECTIVE:    Patient ID: Estefani Snyder is a 77 y.o. female. HPI:   Patient is here today to establish care. She states that she is switching to us from [facility]. She states that she has had issues with her thyroid levels being off in the past.  She is wanting to have this rechecked but she is not fasting today for labs. She is also had elevated cholesterol in the past but is not fasting again today for blood work. She is not currently on anything for cholesterol. She states that she also would like to have her inflammatory markers checked. She is seen Dr. Downing in the past for possible rheumatoid arthritis but states that she was then told told that it was not rheumatoid arthritis. She states that she would like to have her levels rechecked today before she decides if she is going to go back to see them for a follow-up because last time he saw her he told her that he did not know that it was necessary that she come back unless she just needed him. She is on Fosamax for osteoporosis. She states that she is tolerating this well. She also has a history of anxiety and is on Lexapro. She states that she is tolerating this well and it is working well for her. She does complain today of some midline back pain between her shoulder blades especially she lays on her back at night to sleep. She has not had any injury to this area. She denies any rash. She denies any numbness or tingling in her hands or fingers. She states that she has not had any work-up done for this area to this point. She states that it is located mostly on the left side and is subscapular. She states that it does hurt her mid back some as well. She has never had any back issues previously. She has never had any back surgeries in this area.     Past Medical History:   Diagnosis Date    Arthralgia     MANOLO (generalized anxiety disorder)     MANOLO (generalized anxiety disorder)     Hyperlipidemia     Osteopenia      Current Outpatient Medications on File Prior to Visit   Medication Sig Dispense Refill    Cholecalciferol (VITAMIN D3) 50 MCG (2000 UT) CAPS Take by mouth      terbinafine (LAMISIL) 1 % cream Apply topically 2 times daily Apply topically 2 times daily.  Calcium Carb-Cholecalciferol (CALCIUM 1000 + D PO) Take by mouth      Biotin 10 MG tablet Take 10 mg by mouth daily      COLLAGEN PO Take by mouth      magnesium 200 MG TABS tablet Take 200 mg by mouth 2 times daily      DHEA 25 MG CAPS Take 1 capsule by mouth daily      Glucosamine Sulfate 1000 MG CAPS Take 1 capsule by mouth 2 times daily      alendronate (FOSAMAX) 70 MG tablet Take 1 tablet by mouth every 7 days 12 tablet 3    escitalopram (LEXAPRO) 20 MG tablet Take 1 tablet by mouth daily 90 tablet 3    nitrofurantoin (MACRODANTIN) 50 MG capsule Take 50 mg by mouth as needed      Emollient (RA MOISTURIZING CREAM EX) Apply 1 Dose topically daily      Multiple Vitamins-Minerals (CENTRUM SILVER 50+WOMEN PO) Take 1 tablet by mouth daily      aspirin 81 MG EC tablet Take 81 mg by mouth daily      LUTEIN PO Take 1 tablet by mouth daily      Hyaluronic Acid-Vitamin C (HYALURONIC ACID PO) Take 1 tablet by mouth daily       No current facility-administered medications on file prior to visit.       No Known Allergies  Past Surgical History:   Procedure Laterality Date    COLONOSCOPY N/A 9/24/2020    Dr Mando Holland, 10 yr recall    HYSTERECTOMY  2010    LUMBAR DISCECTOMY  2015    LUMBAR LAMINECTOMY  2014    OVARIAN CYST REMOVAL  1978     Family History   Problem Relation Age of Onset    Coronary Art Dis Mother     Prostate Cancer Father     Coronary Art Dis Father     Diabetes type 2  Father     Arthritis Sister     Glaucoma Sister      Social History     Socioeconomic History    Marital status:      Spouse name: Not on file    Number of children: Not on file    Years of education: Not on file    Highest education level: Not on file   Occupational History    Not on file   Social Needs    Financial resource strain: Not on file    Food insecurity     Worry: Not on file     Inability: Not on file    Transportation needs     Medical: Not on file     Non-medical: Not on file   Tobacco Use    Smoking status: Never Smoker    Smokeless tobacco: Never Used   Substance and Sexual Activity    Alcohol use: Yes     Alcohol/week: 2.0 standard drinks     Types: 2 Glasses of wine per week    Drug use: Never    Sexual activity: Not Currently   Lifestyle    Physical activity     Days per week: Not on file     Minutes per session: Not on file    Stress: Not on file   Relationships    Social connections     Talks on phone: Not on file     Gets together: Not on file     Attends Judaism service: Not on file     Active member of club or organization: Not on file     Attends meetings of clubs or organizations: Not on file     Relationship status: Not on file    Intimate partner violence     Fear of current or ex partner: Not on file     Emotionally abused: Not on file     Physically abused: Not on file     Forced sexual activity: Not on file   Other Topics Concern    Not on file   Social History Narrative    Not on file       Review of Systems   Constitutional: Negative for activity change, appetite change and fever. HENT: Negative for congestion and nosebleeds. Eyes: Negative for discharge. Respiratory: Negative for cough and wheezing. Cardiovascular: Negative for chest pain and leg swelling. Gastrointestinal: Negative for abdominal pain, diarrhea, nausea and vomiting. Genitourinary: Negative for difficulty urinating, frequency and urgency. Musculoskeletal: Positive for back pain (mid back pain). Negative for gait problem. Skin: Negative for color change and rash. Neurological: Negative for dizziness and headaches. Hematological: Does not bruise/bleed easily.    Psychiatric/Behavioral: Negative for sleep disturbance and suicidal ideas. OBJECTIVE:    Physical Exam  Vitals signs reviewed. Constitutional:       General: She is not in acute distress. Appearance: Normal appearance. She is well-developed. She is not diaphoretic. HENT:      Head: Normocephalic and atraumatic. Right Ear: External ear normal.      Left Ear: External ear normal.   Neck:      Musculoskeletal: Normal range of motion and neck supple. Cardiovascular:      Rate and Rhythm: Normal rate and regular rhythm. Pulses: Normal pulses. Heart sounds: Normal heart sounds. No murmur. Pulmonary:      Effort: Pulmonary effort is normal. No respiratory distress. Breath sounds: Normal breath sounds. Abdominal:      General: Abdomen is flat. Bowel sounds are normal.      Palpations: Abdomen is soft. Tenderness: There is no abdominal tenderness. Musculoskeletal:         General: Tenderness (midline) present. Skin:     General: Skin is warm and dry. Neurological:      General: No focal deficit present. Mental Status: She is alert and oriented to person, place, and time. Mental status is at baseline. Psychiatric:         Mood and Affect: Mood normal.         Behavior: Behavior normal.         Thought Content: Thought content normal.         Judgment: Judgment normal.        /70 (Site: Left Upper Arm, Position: Sitting, Cuff Size: Medium Adult)   Pulse 65   Temp 97.6 °F (36.4 °C) (Temporal)   Resp 16   Ht 5' 6\" (1.676 m)   Wt 124 lb (56.2 kg)   SpO2 97%   BMI 20.01 kg/m²      ASSESSMENT:    Janet was seen today for new patient. Diagnoses and all orders for this visit:    Arthritis  -     CBC Auto Differential; Future  -     Comprehensive Metabolic Panel; Future  -     Lipid Panel; Future  -     TSH without Reflex; Future  -     Rheumatoid Factor; Future  -     Sedimentation Rate; Future  -     C-Reactive Protein;  Future    Wellness examination  -     CBC Auto Differential; Future  -

## 2021-04-27 ASSESSMENT — ENCOUNTER SYMPTOMS
VOMITING: 0
BACK PAIN: 1
DIARRHEA: 0
WHEEZING: 0
EYE DISCHARGE: 0
COUGH: 0
NAUSEA: 0
COLOR CHANGE: 0
ABDOMINAL PAIN: 0

## 2021-05-10 ENCOUNTER — NURSE ONLY (OUTPATIENT)
Dept: PRIMARY CARE CLINIC | Age: 66
End: 2021-05-10
Payer: MEDICARE

## 2021-05-10 DIAGNOSIS — R63.4 WEIGHT LOSS, UNINTENTIONAL: ICD-10-CM

## 2021-05-10 DIAGNOSIS — M25.50 ARTHRALGIA, UNSPECIFIED JOINT: ICD-10-CM

## 2021-05-10 DIAGNOSIS — M19.90 ARTHRITIS: ICD-10-CM

## 2021-05-10 DIAGNOSIS — E78.5 HYPERLIPIDEMIA, UNSPECIFIED HYPERLIPIDEMIA TYPE: ICD-10-CM

## 2021-05-10 DIAGNOSIS — R53.83 FATIGUE, UNSPECIFIED TYPE: ICD-10-CM

## 2021-05-10 DIAGNOSIS — Z00.00 WELLNESS EXAMINATION: ICD-10-CM

## 2021-05-10 LAB
ALBUMIN SERPL-MCNC: 4.5 G/DL (ref 3.5–5.2)
ALP BLD-CCNC: 39 U/L (ref 35–104)
ALT SERPL-CCNC: 13 U/L (ref 5–33)
ANION GAP SERPL CALCULATED.3IONS-SCNC: 11 MMOL/L (ref 7–19)
AST SERPL-CCNC: 19 U/L (ref 5–32)
BASOPHILS ABSOLUTE: 0 K/UL (ref 0–0.2)
BASOPHILS RELATIVE PERCENT: 0.4 % (ref 0–1)
BILIRUB SERPL-MCNC: 0.3 MG/DL (ref 0.2–1.2)
BUN BLDV-MCNC: 16 MG/DL (ref 8–23)
C-REACTIVE PROTEIN: <0.3 MG/DL (ref 0–0.5)
CALCIUM SERPL-MCNC: 9.8 MG/DL (ref 8.8–10.2)
CHLORIDE BLD-SCNC: 101 MMOL/L (ref 98–111)
CHOLESTEROL, TOTAL: 183 MG/DL (ref 160–199)
CO2: 28 MMOL/L (ref 22–29)
CREAT SERPL-MCNC: 0.7 MG/DL (ref 0.5–0.9)
EOSINOPHILS ABSOLUTE: 0.1 K/UL (ref 0–0.6)
EOSINOPHILS RELATIVE PERCENT: 1.9 % (ref 0–5)
GFR AFRICAN AMERICAN: >59
GFR NON-AFRICAN AMERICAN: >60
GLUCOSE BLD-MCNC: 81 MG/DL (ref 74–109)
HCT VFR BLD CALC: 38.3 % (ref 37–47)
HDLC SERPL-MCNC: 80 MG/DL (ref 65–121)
HEMOGLOBIN: 12.2 G/DL (ref 12–16)
IMMATURE GRANULOCYTES #: 0 K/UL
LDL CHOLESTEROL CALCULATED: 93 MG/DL
LYMPHOCYTES ABSOLUTE: 1.1 K/UL (ref 1.1–4.5)
LYMPHOCYTES RELATIVE PERCENT: 24.2 % (ref 20–40)
MCH RBC QN AUTO: 30.2 PG (ref 27–31)
MCHC RBC AUTO-ENTMCNC: 31.9 G/DL (ref 33–37)
MCV RBC AUTO: 94.8 FL (ref 81–99)
MONOCYTES ABSOLUTE: 0.4 K/UL (ref 0–0.9)
MONOCYTES RELATIVE PERCENT: 9 % (ref 0–10)
NEUTROPHILS ABSOLUTE: 3 K/UL (ref 1.5–7.5)
NEUTROPHILS RELATIVE PERCENT: 64.5 % (ref 50–65)
PDW BLD-RTO: 13.3 % (ref 11.5–14.5)
PLATELET # BLD: 324 K/UL (ref 130–400)
PMV BLD AUTO: 8.8 FL (ref 9.4–12.3)
POTASSIUM SERPL-SCNC: 4.2 MMOL/L (ref 3.5–5)
RBC # BLD: 4.04 M/UL (ref 4.2–5.4)
RHEUMATOID FACTOR: 28 IU/ML
SEDIMENTATION RATE, ERYTHROCYTE: 10 MM/HR (ref 0–25)
SODIUM BLD-SCNC: 140 MMOL/L (ref 136–145)
T4 FREE: 0.87 NG/DL (ref 0.93–1.7)
TOTAL PROTEIN: 7.5 G/DL (ref 6.6–8.7)
TRIGL SERPL-MCNC: 50 MG/DL (ref 0–149)
TSH SERPL DL<=0.05 MIU/L-ACNC: 3.58 UIU/ML (ref 0.27–4.2)
WBC # BLD: 4.7 K/UL (ref 4.8–10.8)

## 2021-05-10 PROCEDURE — 36415 COLL VENOUS BLD VENIPUNCTURE: CPT | Performed by: FAMILY MEDICINE

## 2021-05-21 RX ORDER — ESCITALOPRAM OXALATE 20 MG/1
20 TABLET ORAL DAILY
Qty: 90 TABLET | Refills: 3 | Status: SHIPPED | OUTPATIENT
Start: 2021-05-21 | End: 2022-05-17 | Stop reason: SDUPTHER

## 2021-06-29 RX ORDER — ALENDRONATE SODIUM 70 MG/1
70 TABLET ORAL
Qty: 12 TABLET | Refills: 3 | Status: SHIPPED | OUTPATIENT
Start: 2021-06-29 | End: 2022-05-27 | Stop reason: SDUPTHER

## 2021-08-13 ENCOUNTER — TRANSCRIBE ORDERS (OUTPATIENT)
Dept: ADMINISTRATIVE | Facility: HOSPITAL | Age: 66
End: 2021-08-13

## 2021-08-13 DIAGNOSIS — Z12.31 ENCOUNTER FOR SCREENING MAMMOGRAM FOR MALIGNANT NEOPLASM OF BREAST: Primary | ICD-10-CM

## 2021-09-15 ENCOUNTER — HOSPITAL ENCOUNTER (OUTPATIENT)
Dept: MAMMOGRAPHY | Facility: HOSPITAL | Age: 66
Discharge: HOME | End: 2021-09-15
Admitting: FAMILY MEDICINE

## 2021-09-15 DIAGNOSIS — Z12.31 ENCOUNTER FOR SCREENING MAMMOGRAM FOR MALIGNANT NEOPLASM OF BREAST: ICD-10-CM

## 2021-09-15 PROCEDURE — 77067 SCR MAMMO BI INCL CAD: CPT

## 2021-09-15 PROCEDURE — 77063 BREAST TOMOSYNTHESIS BI: CPT

## 2021-11-05 ENCOUNTER — TELEPHONE (OUTPATIENT)
Dept: PRIMARY CARE CLINIC | Age: 66
End: 2021-11-05

## 2021-11-09 ENCOUNTER — TELEPHONE (OUTPATIENT)
Dept: PRIMARY CARE CLINIC | Age: 66
End: 2021-11-09

## 2022-05-10 ENCOUNTER — OFFICE VISIT (OUTPATIENT)
Dept: PRIMARY CARE CLINIC | Age: 67
End: 2022-05-10
Payer: MEDICARE

## 2022-05-10 VITALS
TEMPERATURE: 97.8 F | OXYGEN SATURATION: 98 % | RESPIRATION RATE: 16 BRPM | WEIGHT: 134.2 LBS | BODY MASS INDEX: 21.57 KG/M2 | DIASTOLIC BLOOD PRESSURE: 70 MMHG | HEART RATE: 66 BPM | HEIGHT: 66 IN | SYSTOLIC BLOOD PRESSURE: 110 MMHG

## 2022-05-10 DIAGNOSIS — Z00.00 MEDICARE ANNUAL WELLNESS VISIT, SUBSEQUENT: Primary | ICD-10-CM

## 2022-05-10 PROCEDURE — 3017F COLORECTAL CA SCREEN DOC REV: CPT | Performed by: FAMILY MEDICINE

## 2022-05-10 PROCEDURE — 1123F ACP DISCUSS/DSCN MKR DOCD: CPT | Performed by: FAMILY MEDICINE

## 2022-05-10 PROCEDURE — G0439 PPPS, SUBSEQ VISIT: HCPCS | Performed by: FAMILY MEDICINE

## 2022-05-10 PROCEDURE — 4040F PNEUMOC VAC/ADMIN/RCVD: CPT | Performed by: FAMILY MEDICINE

## 2022-05-10 ASSESSMENT — PATIENT HEALTH QUESTIONNAIRE - PHQ9
SUM OF ALL RESPONSES TO PHQ QUESTIONS 1-9: 0
SUM OF ALL RESPONSES TO PHQ QUESTIONS 1-9: 0
2. FEELING DOWN, DEPRESSED OR HOPELESS: 0
SUM OF ALL RESPONSES TO PHQ QUESTIONS 1-9: 0
SUM OF ALL RESPONSES TO PHQ QUESTIONS 1-9: 0
1. LITTLE INTEREST OR PLEASURE IN DOING THINGS: 0
SUM OF ALL RESPONSES TO PHQ9 QUESTIONS 1 & 2: 0

## 2022-05-10 ASSESSMENT — LIFESTYLE VARIABLES
HOW MANY STANDARD DRINKS CONTAINING ALCOHOL DO YOU HAVE ON A TYPICAL DAY: 1 OR 2
HOW OFTEN DO YOU HAVE A DRINK CONTAINING ALCOHOL: 2-3 TIMES A WEEK

## 2022-05-17 RX ORDER — ESCITALOPRAM OXALATE 20 MG/1
20 TABLET ORAL DAILY
Qty: 90 TABLET | Refills: 1 | Status: SHIPPED | OUTPATIENT
Start: 2022-05-17

## 2022-05-18 NOTE — PROGRESS NOTES
Medicare Annual Wellness Visit    Stuart Nyhan is here for Medicare AWV    Assessment & Plan   Medicare annual wellness visit, subsequent      Recommendations for Preventive Services Due: see orders and patient instructions/AVS.  Recommended screening schedule for the next 5-10 years is provided to the patient in written form: see Patient Instructions/AVS.     Return for Medicare Annual Wellness Visit in 1 year. Subjective   The following acute and/or chronic problems were also addressed today:  Patient is seen today for a Medicare annual wellness. States that overall she is doing well. She states that she is not having any acute problems today. Blood pressure is good in office today. She is not fasting today for labs and states that she will return to have blood work done in the next few weeks. She does have a history of hyperlipidemia. She is not on any medication for this. Patient's complete Health Risk Assessment and screening values have been reviewed and are found in Flowsheets. The following problems were reviewed today and where indicated follow up appointments were made and/or referrals ordered.     Positive Risk Factor Screenings with Interventions:               General Health and ACP:  General  In general, how would you say your health is?: Very Good  In the past 7 days, have you experienced any of the following: New or Increased Pain, New or Increased Fatigue, Loneliness, Social Isolation, Stress or Anger?: No  Do you get the social and emotional support that you need?: Yes  Do you have a Living Will?: Yes    Advance Directives     Power of  Living Will ACP-Advance Directive ACP-Power of     Not on File Not on File Not on File Not on File      General Health Risk Interventions:  · none     Hearing/Vision:  Do you or your family notice any trouble with your hearing that hasn't been managed with hearing aids?: No  Do you have difficulty driving, watching TV, or doing any of your daily activities because of your eyesight?: No  Have you had an eye exam within the past year?: (!) No  No exam data present    Hearing/Vision Interventions:  · Vision concerns:  patient encouraged to make appointment with his/her eye specialist    Safety:  Do you have working smoke detectors?: Yes  Do you have any tripping hazards - loose or unsecured carpets or rugs?: (!) Yes  Do you have any tripping hazards - clutter in doorways, halls, or stairs?: No  Do you have either shower bars, grab bars, non-slip mats or non-slip surfaces in your shower or bathtub?: (!) No  Do all of your stairways have a railing or banister?: Not Applicable  Do you always fasten your seatbelt when you are in a car?: Yes    Safety Interventions:  · Home safety tips provided           Objective   Vitals:    05/10/22 1445   BP: 110/70   Pulse: 66   Resp: 16   Temp: 97.8 °F (36.6 °C)   SpO2: 98%   Weight: 134 lb 3.2 oz (60.9 kg)   Height: 5' 6\" (1.676 m)      Body mass index is 21.66 kg/m². General Appearance: alert and oriented to person, place and time, well-developed and well-nourished, in no acute distress  Skin: warm and dry, no rash or erythema  Head: normocephalic and atraumatic  Eyes: pupils equal, round, and reactive to light, extraocular eye movements intact, conjunctivae normal  Neck: neck supple and non tender without mass, no thyromegaly or thyroid nodules, no cervical lymphadenopathy   Pulmonary/Chest: clear to auscultation bilaterally- no wheezes, rales or rhonchi, normal air movement, no respiratory distress  Cardiovascular: normal rate, normal S1 and S2, no gallops, intact distal pulses and no carotid bruits  Extremities: no cyanosis and no clubbing  Musculoskeletal: normal range of motion, no joint swelling, deformity or tenderness  Neurologic: gait and coordination normal and speech normal       No Known Allergies  Prior to Visit Medications    Medication Sig Taking?  Authorizing Provider   alendronate (FOSAMAX) 70 MG tablet Take 1 tablet by mouth every 7 days Yes Sherran Fothergill, MD   Cholecalciferol (VITAMIN D3) 50 MCG (2000 UT) CAPS Take by mouth Yes Historical Provider, MD   Calcium Carb-Cholecalciferol (CALCIUM 1000 + D PO) Take by mouth Yes Historical Provider, MD   Biotin 10 MG tablet Take 10 mg by mouth daily Yes Historical Provider, MD   COLLAGEN PO Take by mouth Yes Historical Provider, MD   magnesium 200 MG TABS tablet Take 200 mg by mouth 2 times daily Yes Historical Provider, MD   DHEA 25 MG CAPS Take 1 capsule by mouth daily Yes Historical Provider, MD   Glucosamine Sulfate 1000 MG CAPS Take 1 capsule by mouth 2 times daily Yes Historical Provider, MD   nitrofurantoin (MACRODANTIN) 50 MG capsule Take 50 mg by mouth as needed Yes Historical Provider, MD   Emollient (RA MOISTURIZING CREAM EX) Apply 1 Dose topically daily Yes Historical Provider, MD   Multiple Vitamins-Minerals (CENTRUM SILVER 50+WOMEN PO) Take 1 tablet by mouth daily Yes Historical Provider, MD   aspirin 81 MG EC tablet Take 81 mg by mouth daily Yes Historical Provider, MD   LUTEIN PO Take 1 tablet by mouth daily Yes Historical Provider, MD   Hyaluronic Acid-Vitamin C (HYALURONIC ACID PO) Take 1 tablet by mouth daily Yes Historical Provider, MD   escitalopram (LEXAPRO) 20 MG tablet Take 1 tablet by mouth daily  Sherran Fothergill, MD       [facility] (Including outside providers/suppliers regularly involved in providing care):   Patient Care Team:  Sherran Fothergill, MD as PCP - General (Family Medicine)  Sherran Fothergill, MD as PCP - [facility] Empaneled Provider     Reviewed and updated this visit:  Tobacco  Allergies  Meds  Problems  Med Hx  Surg Hx  Soc Hx  Fam Hx

## 2022-05-18 NOTE — PATIENT INSTRUCTIONS
Personalized Preventive Plan for Andrew Aggarwal - 5/10/2022  Medicare offers a range of preventive health benefits. Some of the tests and screenings are paid in full while other may be subject to a deductible, co-insurance, and/or copay. Some of these benefits include a comprehensive review of your medical history including lifestyle, illnesses that may run in your family, and various assessments and screenings as appropriate. After reviewing your medical record and screening and assessments performed today your provider may have ordered immunizations, labs, imaging, and/or referrals for you. A list of these orders (if applicable) as well as your Preventive Care list are included within your After Visit Summary for your review. Other Preventive Recommendations:    · A preventive eye exam performed by an eye specialist is recommended every 1-2 years to screen for glaucoma; cataracts, macular degeneration, and other eye disorders. · A preventive dental visit is recommended every 6 months. · Try to get at least 150 minutes of exercise per week or 10,000 steps per day on a pedometer . · Order or download the FREE \"Exercise & Physical Activity: Your Everyday Guide\" from [employer]. Call 1-766.137.8070 or search [employer] online. · You need 8612-7376 mg of calcium and 2701-0295 IU of vitamin D per day. It is possible to meet your calcium requirement with diet alone, but a vitamin D supplement is usually necessary to meet this goal.  · When exposed to the sun, use a sunscreen that protects against both UVA and UVB radiation with an SPF of 30 or greater. Reapply every 2 to 3 hours or after sweating, drying off with a towel, or swimming. · Always wear a seat belt when traveling in a car. Always wear a helmet when riding a bicycle or motorcycle.

## 2022-05-27 ENCOUNTER — OFFICE VISIT (OUTPATIENT)
Dept: PRIMARY CARE CLINIC | Age: 67
End: 2022-05-27
Payer: MEDICARE

## 2022-05-27 VITALS
SYSTOLIC BLOOD PRESSURE: 96 MMHG | HEIGHT: 66 IN | HEART RATE: 77 BPM | OXYGEN SATURATION: 97 % | DIASTOLIC BLOOD PRESSURE: 52 MMHG | WEIGHT: 134 LBS | BODY MASS INDEX: 21.53 KG/M2 | TEMPERATURE: 99.7 F

## 2022-05-27 DIAGNOSIS — R05.9 COUGH: ICD-10-CM

## 2022-05-27 DIAGNOSIS — M54.50 LOW BACK PAIN, UNSPECIFIED BACK PAIN LATERALITY, UNSPECIFIED CHRONICITY, UNSPECIFIED WHETHER SCIATICA PRESENT: Primary | ICD-10-CM

## 2022-05-27 DIAGNOSIS — R51.9 NONINTRACTABLE HEADACHE, UNSPECIFIED CHRONICITY PATTERN, UNSPECIFIED HEADACHE TYPE: ICD-10-CM

## 2022-05-27 DIAGNOSIS — R52 BODY ACHES: ICD-10-CM

## 2022-05-27 DIAGNOSIS — M85.80 OSTEOPENIA, UNSPECIFIED LOCATION: ICD-10-CM

## 2022-05-27 LAB
APPEARANCE FLUID: CLEAR
BILIRUBIN, POC: NORMAL
BLOOD URINE, POC: NORMAL
CLARITY, POC: CLEAR
COLOR, POC: YELLOW
GLUCOSE URINE, POC: NORMAL
INFLUENZA A ANTIBODY: NEGATIVE
INFLUENZA B ANTIBODY: NEGATIVE
KETONES, POC: NORMAL
LEUKOCYTE EST, POC: NORMAL
NITRITE, POC: NORMAL
PH, POC: 7
PROTEIN, POC: NORMAL
S PYO AG THROAT QL: NORMAL
SARS-COV-2, PCR: DETECTED
SPECIFIC GRAVITY, POC: 1
UROBILINOGEN, POC: NORMAL

## 2022-05-27 PROCEDURE — 1036F TOBACCO NON-USER: CPT | Performed by: NURSE PRACTITIONER

## 2022-05-27 PROCEDURE — 1123F ACP DISCUSS/DSCN MKR DOCD: CPT | Performed by: NURSE PRACTITIONER

## 2022-05-27 PROCEDURE — 3017F COLORECTAL CA SCREEN DOC REV: CPT | Performed by: NURSE PRACTITIONER

## 2022-05-27 PROCEDURE — 87880 STREP A ASSAY W/OPTIC: CPT | Performed by: NURSE PRACTITIONER

## 2022-05-27 PROCEDURE — 81002 URINALYSIS NONAUTO W/O SCOPE: CPT | Performed by: NURSE PRACTITIONER

## 2022-05-27 PROCEDURE — 87804 INFLUENZA ASSAY W/OPTIC: CPT | Performed by: NURSE PRACTITIONER

## 2022-05-27 PROCEDURE — G8399 PT W/DXA RESULTS DOCUMENT: HCPCS | Performed by: NURSE PRACTITIONER

## 2022-05-27 PROCEDURE — 99214 OFFICE O/P EST MOD 30 MIN: CPT | Performed by: NURSE PRACTITIONER

## 2022-05-27 PROCEDURE — 1090F PRES/ABSN URINE INCON ASSESS: CPT | Performed by: NURSE PRACTITIONER

## 2022-05-27 PROCEDURE — G8427 DOCREV CUR MEDS BY ELIG CLIN: HCPCS | Performed by: NURSE PRACTITIONER

## 2022-05-27 PROCEDURE — G8420 CALC BMI NORM PARAMETERS: HCPCS | Performed by: NURSE PRACTITIONER

## 2022-05-27 RX ORDER — ALENDRONATE SODIUM 70 MG/1
70 TABLET ORAL
Qty: 12 TABLET | Refills: 3 | Status: SHIPPED | OUTPATIENT
Start: 2022-05-27 | End: 2022-09-19 | Stop reason: ALTCHOICE

## 2022-05-27 ASSESSMENT — ENCOUNTER SYMPTOMS
SORE THROAT: 1
COUGH: 1
BACK PAIN: 1
GASTROINTESTINAL NEGATIVE: 1
EYES NEGATIVE: 1
ALLERGIC/IMMUNOLOGIC NEGATIVE: 1

## 2022-05-27 NOTE — PATIENT INSTRUCTIONS
Viral syndrome   Many illnesses are caused by viruses. These conditions usually run their course in 7-14 days. Antibiotics do not help fight viral infections and are not needed at this time. Viral syndromes are treated with symptomatic support. You may take tylenol or ibuprofen for fever or aches and pains. Stay hydrated by taking sips of water or non caffeinated, noncarbonated, and nonalcoholic beverages throughout the day. For sore throat, you may gargle with warm salt water, use lozenges or sprays. Using a daily antihistamine such as Claritin, Zyrtec or Allegra can help with upper respiratory symptoms. Benadryl can be sedating but is helpful at drying secretions and may be taken at night. Call if you have a fever greater than 102 F or if symptoms do not improve. Your provider may also send you in prescription medications depending on your symptoms and their severity. Take all medications as directed on package unless specifically told otherwise.

## 2022-05-27 NOTE — PROGRESS NOTES
[address] [facility]  [address] [address] [place] [address]  [address] [place] [address]  Dept: [phone]  Dept Fax: [phone]  Loc: [phone]    Mackenzie Ballard is a 79 y.o. female who presents today for her medical conditions/complaints as noted below. Mackenzie Ballard is c/o of Headache (Symptoms started Wednesday.), Pharyngitis, Generalized Body Aches, Cough, and Lower Back Pain        HPI:     HPI this 70-year-old female presents today for headache, sore throat, body aches, cough and lower back pain. She states that they had went out of state to a recent party or gathering and that as soon as they got back her  had had symptoms of allergies and sinus congestion. That her symptoms started after that. Chief Complaint   Patient presents with    Headache     Symptoms started Wednesday.     Pharyngitis    Generalized Body Aches    Cough    Lower Back Pain     Past Medical History:   Diagnosis Date    Arthralgia     MANOLO (generalized anxiety disorder)     MANOLO (generalized anxiety disorder)     Hyperlipidemia     Osteopenia       Past Surgical History:   Procedure Laterality Date    COLONOSCOPY N/A 9/24/2020    Dr Peggy Velásquez, 10 yr recall    HYSTERECTOMY  2010    LUMBAR DISCECTOMY  2015    LUMBAR LAMINECTOMY  2014    OVARIAN CYST REMOVAL  1978       Vitals 5/27/2022 5/10/2022 4/26/2021 10/15/2020 9/24/2020 0/97/3759   SYSTOLIC 96 498 055 764 90 84   DIASTOLIC 52 70 70 64 48 46   Site Left Upper Arm - Left Upper Arm - - -   Position Sitting - Sitting - - -   Cuff Size Medium Adult - Medium Adult - - -   Pulse 77 66 65 70 59 60   Temp 99.7 97.8 97.6 97.7 - -   Resp - 16 16 - 20 18   SpO2 97 98 97 98 98 100   Weight 134 lb 134 lb 3.2 oz 124 lb 116 lb 9.6 oz - -   Height 5' 6\" 5' 6\" 5' 6\" 5' 5.5\" - -   Body mass index 21.63 kg/m2 21.66 kg/m2 20.01 kg/m2 19.11 kg/m2 - -   Some recent data might be hidden       Family History   Problem Relation Age of Onset    Coronary Art Dis Refill done and sent electronically to pharmacy selected for encounter.  Cailin Livingston M.D.    Mother     Prostate Cancer Father     Coronary Art Dis Father     Diabetes type 2  Father     Arthritis Sister     Glaucoma Sister        Social History     Tobacco Use    Smoking status: Never Smoker    Smokeless tobacco: Never Used   Substance Use Topics    Alcohol use: Yes     Alcohol/week: 2.0 standard drinks     Types: 2 Glasses of wine per week      Current Outpatient Medications on File Prior to Visit   Medication Sig Dispense Refill    escitalopram (LEXAPRO) 20 MG tablet Take 1 tablet by mouth daily 90 tablet 1    Cholecalciferol (VITAMIN D3) 50 MCG (2000 UT) CAPS Take by mouth      Calcium Carb-Cholecalciferol (CALCIUM 1000 + D PO) Take by mouth      Biotin 10 MG tablet Take 10 mg by mouth daily      COLLAGEN PO Take by mouth      magnesium 200 MG TABS tablet Take 200 mg by mouth 2 times daily      DHEA 25 MG CAPS Take 1 capsule by mouth daily      Glucosamine Sulfate 1000 MG CAPS Take 1 capsule by mouth 2 times daily      nitrofurantoin (MACRODANTIN) 50 MG capsule Take 50 mg by mouth as needed      Emollient (RA MOISTURIZING CREAM EX) Apply 1 Dose topically daily      Multiple Vitamins-Minerals (CENTRUM SILVER 50+WOMEN PO) Take 1 tablet by mouth daily      aspirin 81 MG EC tablet Take 81 mg by mouth daily      LUTEIN PO Take 1 tablet by mouth daily      Hyaluronic Acid-Vitamin C (HYALURONIC ACID PO) Take 1 tablet by mouth daily       No current facility-administered medications on file prior to visit.      No Known Allergies    Health Maintenance   Topic Date Due    DTaP/Tdap/Td vaccine (2 - Td or Tdap) 06/11/2022    Pneumococcal 65+ years Vaccine (2 - PPSV23 or PCV20) 06/11/2022    Depression Screen  05/10/2023    Annual Wellness Visit (AWV)  05/11/2023    Breast cancer screen  09/15/2023    Lipids  05/10/2026    Colorectal Cancer Screen  09/24/2030    DEXA (modify frequency per FRAX score)  Completed    Flu vaccine  Completed    Shingles vaccine  Completed    COVID-19 Vaccine  Completed    Hepatitis C screen  Completed    Hepatitis A vaccine  Aged Out    Hepatitis B vaccine  Aged Out    Hib vaccine  Aged Out    Meningococcal (ACWY) vaccine  Aged Out       Subjective:      Review of Systems   Constitutional: Negative. Negative for fatigue and fever. HENT: Positive for congestion, ear pain and sore throat. Eyes: Negative. Respiratory: Positive for cough. Cardiovascular: Negative. Gastrointestinal: Negative. Endocrine: Negative. Genitourinary: Negative. Negative for difficulty urinating and dysuria. Musculoskeletal: Positive for back pain and myalgias. Skin: Negative. Allergic/Immunologic: Negative. Neurological: Positive for headaches. Hematological: Negative. Psychiatric/Behavioral: Negative. Objective:     Physical Exam  Vitals and nursing note reviewed. Constitutional:       General: She is not in acute distress. Appearance: Normal appearance. She is not ill-appearing or toxic-appearing. HENT:      Head: Normocephalic and atraumatic. Right Ear: Tympanic membrane, ear canal and external ear normal.      Left Ear: Tympanic membrane, ear canal and external ear normal.      Nose: Congestion present. Mouth/Throat:      Mouth: Mucous membranes are moist.      Pharynx: Posterior oropharyngeal erythema present. Eyes:      Extraocular Movements: Extraocular movements intact. Conjunctiva/sclera: Conjunctivae normal.      Pupils: Pupils are equal, round, and reactive to light. Cardiovascular:      Rate and Rhythm: Normal rate and regular rhythm. Pulses: Normal pulses. Heart sounds: Normal heart sounds. Pulmonary:      Effort: Pulmonary effort is normal. No respiratory distress. Breath sounds: Normal breath sounds. No wheezing, rhonchi or rales. Abdominal:      General: Bowel sounds are normal.      Palpations: Abdomen is soft. Musculoskeletal:         General: Normal range of motion. Cervical back: Normal range of motion and neck supple. No rigidity or tenderness. Lymphadenopathy:      Cervical: Cervical adenopathy present. Skin:     General: Skin is warm and dry. Coloration: Skin is not jaundiced or pale. Findings: No erythema or rash. Neurological:      Mental Status: She is alert and oriented to person, place, and time. Mental status is at baseline. Psychiatric:         Mood and Affect: Mood normal.         Behavior: Behavior normal.       BP (!) 96/52 (Site: Left Upper Arm, Position: Sitting, Cuff Size: Medium Adult)   Pulse 77   Temp 99.7 °F (37.6 °C)   Ht 5' 6\" (1.676 m)   Wt 134 lb (60.8 kg)   SpO2 97%   BMI 21.63 kg/m²     Assessment:       Diagnosis Orders   1. Low back pain, unspecified back pain laterality, unspecified chronicity, unspecified whether sciatica present  POCT Urinalysis no Micro    POCT Influenza A/B    COVID-19    POCT rapid strep A   2. Cough  POCT Influenza A/B    COVID-19    POCT rapid strep A   3. Body aches  POCT Influenza A/B    COVID-19    POCT rapid strep A   4. Nonintractable headache, unspecified chronicity pattern, unspecified headache type  POCT Influenza A/B    COVID-19    POCT rapid strep A   5. Osteopenia, unspecified location           Plan:   POCT influenza in office today. Carina Tierenyner COVID testing in office today. Please self quarantine till notified of results. Your results should result in your MyChart sometime this evening. POCT rapid strep in office today    Viral syndrome   Many illnesses are caused by viruses. These conditions usually run their course in 7-14 days. Antibiotics do not help fight viral infections and are not needed at this time. Viral syndromes are treated with symptomatic support. You may take tylenol or ibuprofen for fever or aches and pains. Stay hydrated by taking sips of water or non caffeinated, noncarbonated, and nonalcoholic beverages throughout the day.  For sore throat, you may gargle with warm salt water, use lozenges or sprays. Using a daily antihistamine such as Claritin, Zyrtec or Allegra can help with upper respiratory symptoms. Benadryl can be sedating but is helpful at drying secretions and may be taken at night. Call if you have a fever greater than 102 F or if symptoms do not improve. Your provider may also send you in prescription medications depending on your symptoms and their severity. Take all medications as directed on package unless specifically told otherwise. Covid Supportive Treatments    Zinc 250 mg daily for 5 days and then decrease to 50 mg a day. Vitamin C 1000 mg a day. Vitamin D 0522-1392 mcg a day. Aspirin 325 mg a day. Daily antihistamine of choice ( Claritin, Allegra, or Zyrtec)  Pepcid daily. Tylenol for aches, pain and fever. Your provider may also send in prescriptions for symptom specific treatment  our provider may also send in prescriptions for symptom specific treatment. Covid is a viral pneumonia and some antibiotics will not help this. Mucinex or delsym for chest congestion. Check oxygen saturation regularly and if below 90% go to the ER. You may qualify for home oxygen and breathing treatments if your oxygen is staying around 90%. According to current [place] guidelines if you have tested positive for COVID and have symptoms you are to isolate for 10 days from the day the symptoms began. If you tested positive for COVID and have never had symptoms you must isolate for 5 days from the day of your test.  If you are not fully vaccinated or booster eligible but not yet boostered and have been in close contact with someone diagnosed with COVID quarantine from 10 days from the exposure. This may be shortened to 5 days if you have no symptoms and tested negative for COVID on day 5      5. Chronic condition uncontrolled. Refill for Fosamax sent to pharmacy.   Patient reports that there was a couple times that she did have some significant stomach pain about the next day after taking her Fosamax. She states she does not sure if if it is correlated or not. She was instructed to make sure that she follows all of the instructions as far as staying upright and water intake following ingestion of this medication. Also instructed that she could take like Mylanta the 2 or 3 days prior to to make sure that her stomach environment is not irritated. If she develops further symptoms discussed this further with Dr. Korina Martinez to see what the next option would be for her. Patient given educational materials -see patient instructions. Discussed use, benefit, and side effects of prescribed medications. All patient questions answered. Pt voiced understanding. Reviewed health maintenance. Instructed to continue currentmedications, diet and exercise. Patient agreed with treatment plan. Follow up as directed. MEDICATIONS:  Orders Placed This Encounter   Medications    alendronate (FOSAMAX) 70 MG tablet     Sig: Take 1 tablet by mouth every 7 days     Dispense:  12 tablet     Refill:  3         ORDERS:  Orders Placed This Encounter   Procedures    COVID-19    COVID-19    COVID-19    POCT Urinalysis no Micro    POCT Influenza A/B    POCT rapid strep A       Follow-up:  Return if symptoms worsen or fail to improve. PATIENT INSTRUCTIONS:  Patient Instructions   Viral syndrome   Many illnesses are caused by viruses. These conditions usually run their course in 7-14 days. Antibiotics do not help fight viral infections and are not needed at this time. Viral syndromes are treated with symptomatic support. You may take tylenol or ibuprofen for fever or aches and pains. Stay hydrated by taking sips of water or non caffeinated, noncarbonated, and nonalcoholic beverages throughout the day. For sore throat, you may gargle with warm salt water, use lozenges or sprays.  Using a daily antihistamine such as Claritin, Zyrtec or Allegra can help with upper respiratory symptoms. Benadryl can be sedating but is helpful at drying secretions and may be taken at night. Call if you have a fever greater than 102 F or if symptoms do not improve. Your provider may also send you in prescription medications depending on your symptoms and their severity. Take all medications as directed on package unless specifically told otherwise. Electronically signed by CHARLY Rodriguez NP on 5/27/2022 at 2:57 PM    EMR Dragon/transcription disclaimer:  Much of thisencounter note is electronic transcription/translation of spoken language to printed texts. The electronic translation of spoken language may be erroneous, or at times, nonsensical words or phrases may be inadvertentlytranscribed.   Although I have reviewed the note for such errors, some may still exist.

## 2022-07-06 ENCOUNTER — OFFICE VISIT (OUTPATIENT)
Dept: PRIMARY CARE CLINIC | Age: 67
End: 2022-07-06
Payer: MEDICARE

## 2022-07-06 VITALS — WEIGHT: 131 LBS | BODY MASS INDEX: 21.05 KG/M2 | HEIGHT: 66 IN

## 2022-07-06 DIAGNOSIS — M81.0 OSTEOPOROSIS WITHOUT CURRENT PATHOLOGICAL FRACTURE, UNSPECIFIED OSTEOPOROSIS TYPE: Primary | ICD-10-CM

## 2022-07-06 DIAGNOSIS — K21.9 GASTROESOPHAGEAL REFLUX DISEASE, UNSPECIFIED WHETHER ESOPHAGITIS PRESENT: ICD-10-CM

## 2022-07-06 DIAGNOSIS — Z78.0 POSTMENOPAUSAL: ICD-10-CM

## 2022-07-06 DIAGNOSIS — R05.9 COUGH: ICD-10-CM

## 2022-07-06 PROCEDURE — 1090F PRES/ABSN URINE INCON ASSESS: CPT | Performed by: FAMILY MEDICINE

## 2022-07-06 PROCEDURE — 1036F TOBACCO NON-USER: CPT | Performed by: FAMILY MEDICINE

## 2022-07-06 PROCEDURE — G8427 DOCREV CUR MEDS BY ELIG CLIN: HCPCS | Performed by: FAMILY MEDICINE

## 2022-07-06 PROCEDURE — 3017F COLORECTAL CA SCREEN DOC REV: CPT | Performed by: FAMILY MEDICINE

## 2022-07-06 PROCEDURE — 99214 OFFICE O/P EST MOD 30 MIN: CPT | Performed by: FAMILY MEDICINE

## 2022-07-06 PROCEDURE — G8399 PT W/DXA RESULTS DOCUMENT: HCPCS | Performed by: FAMILY MEDICINE

## 2022-07-06 PROCEDURE — G8420 CALC BMI NORM PARAMETERS: HCPCS | Performed by: FAMILY MEDICINE

## 2022-07-06 PROCEDURE — 1123F ACP DISCUSS/DSCN MKR DOCD: CPT | Performed by: FAMILY MEDICINE

## 2022-07-06 RX ORDER — OMEPRAZOLE 40 MG/1
40 CAPSULE, DELAYED RELEASE ORAL
Qty: 30 CAPSULE | Refills: 5 | Status: SHIPPED | OUTPATIENT
Start: 2022-07-06

## 2022-07-06 ASSESSMENT — PATIENT HEALTH QUESTIONNAIRE - PHQ9
SUM OF ALL RESPONSES TO PHQ QUESTIONS 1-9: 0
SUM OF ALL RESPONSES TO PHQ QUESTIONS 1-9: 0
2. FEELING DOWN, DEPRESSED OR HOPELESS: 0
SUM OF ALL RESPONSES TO PHQ QUESTIONS 1-9: 0
1. LITTLE INTEREST OR PLEASURE IN DOING THINGS: 0
SUM OF ALL RESPONSES TO PHQ QUESTIONS 1-9: 0
SUM OF ALL RESPONSES TO PHQ9 QUESTIONS 1 & 2: 0

## 2022-07-11 ASSESSMENT — ENCOUNTER SYMPTOMS
NAUSEA: 0
EYE DISCHARGE: 0
DIARRHEA: 0
ABDOMINAL PAIN: 0
COLOR CHANGE: 0
BACK PAIN: 0
WHEEZING: 0
COUGH: 1
VOMITING: 0

## 2022-07-11 NOTE — PROGRESS NOTES
SUBJECTIVE:    Patient ID: Janie Pemberton is a 79 y.o. female. HPI:   Patient is seen today for complaints of a cough. This has been going on for several months. She states that it started initially after she would eat or drink something but she states that now it is happening constantly and frequently. She states it is more of a dry cough. She states that she is not really coughing up anything. She denies any unintentional weight loss. She denies any fevers chills or night sweats. She denies any hemoptysis. She has not been a smoker. She states that she does not have a history of asthma. She is not on anything for reflux currently. She states that she does occasionally have some heartburn. She does have a history of osteoporosis and has been on Fosamax for a long time. She is unsure exactly how long she has been on it but does know that she has been on it for over 5 years. She states that she has not had a bone density done this year. She would be okay with us getting her set up for 1. She states that she has been diagnosed with osteoporosis previously.     Past Medical History:   Diagnosis Date    Arthralgia     MANOLO (generalized anxiety disorder)     MANOLO (generalized anxiety disorder)     Hyperlipidemia     Osteopenia       Current Outpatient Medications on File Prior to Visit   Medication Sig Dispense Refill    alendronate (FOSAMAX) 70 MG tablet Take 1 tablet by mouth every 7 days 12 tablet 3    escitalopram (LEXAPRO) 20 MG tablet Take 1 tablet by mouth daily 90 tablet 1    Cholecalciferol (VITAMIN D3) 50 MCG (2000 UT) CAPS Take by mouth      Calcium Carb-Cholecalciferol (CALCIUM 1000 + D PO) Take by mouth      Biotin 10 MG tablet Take 10 mg by mouth daily      COLLAGEN PO Take by mouth      magnesium 200 MG TABS tablet Take 200 mg by mouth 2 times daily      DHEA 25 MG CAPS Take 1 capsule by mouth daily      Glucosamine Sulfate 1000 MG CAPS Take 1 capsule by mouth 2 times daily      nitrofurantoin (MACRODANTIN) 50 MG capsule Take 50 mg by mouth as needed      Emollient (RA MOISTURIZING CREAM EX) Apply 1 Dose topically daily      Multiple Vitamins-Minerals (CENTRUM SILVER 50+WOMEN PO) Take 1 tablet by mouth daily      aspirin 81 MG EC tablet Take 81 mg by mouth as needed (M, W and F)       LUTEIN PO Take 1 tablet by mouth daily      Hyaluronic Acid-Vitamin C (HYALURONIC ACID PO) Take 1 tablet by mouth daily       No current facility-administered medications on file prior to visit. No Known Allergies    Review of Systems   Constitutional: Negative for activity change, appetite change and fever. HENT: Negative for congestion and nosebleeds. Eyes: Negative for discharge. Respiratory: Positive for cough. Negative for wheezing. Cardiovascular: Negative for chest pain and leg swelling. Gastrointestinal: Negative for abdominal pain, diarrhea, nausea and vomiting. Genitourinary: Negative for difficulty urinating, frequency and urgency. Musculoskeletal: Negative for back pain and gait problem. Skin: Negative for color change and rash. Neurological: Negative for dizziness and headaches. Hematological: Does not bruise/bleed easily. Psychiatric/Behavioral: Negative for sleep disturbance and suicidal ideas. OBJECTIVE:    Physical Exam  Vitals reviewed. Constitutional:       General: She is not in acute distress. Appearance: Normal appearance. She is well-developed. She is not diaphoretic. HENT:      Head: Normocephalic and atraumatic. Right Ear: External ear normal.      Left Ear: External ear normal.   Cardiovascular:      Rate and Rhythm: Normal rate and regular rhythm. Pulses: Normal pulses. Heart sounds: Normal heart sounds. No murmur heard. Pulmonary:      Effort: Pulmonary effort is normal. No respiratory distress. Breath sounds: Normal breath sounds.    Musculoskeletal:      Cervical back: Normal range of motion and neck supple. Skin:     General: Skin is warm and dry. Neurological:      General: No focal deficit present. Mental Status: She is alert and oriented to person, place, and time. Mental status is at baseline. Psychiatric:         Mood and Affect: Mood normal.         Behavior: Behavior normal.         Thought Content: Thought content normal.         Judgment: Judgment normal.        Ht 5' 6\" (1.676 m)   Wt 131 lb (59.4 kg)   BMI 21.14 kg/m²      ASSESSMENT/PLAN:    1. Osteoporosis without current pathological fracture, unspecified osteoporosis type  2. Postmenopausal  3. Cough  4. Gastroesophageal reflux disease, unspecified whether esophagitis present       Discussed I felt the cough could be related to reflux. We are going to try starting her on Prilosec daily to see if this helps with her symptoms. If it is not getting better then we were going to probably need to put a referral in for her to see ENT. We are going to have her stop the Fosamax because she has been on it for more than 5 years. I would like for her to have her bone density done again this year when she is due for her mammogram.  If it still shows worsening of the bone density then we may look at doing Prolia at that point. Last bone density only showed ostopenia at last check. PDMP Monitoring:    Last PDMP [place] as Reviewed [facility]):  Review User Review Instant Review Result            Urine Drug Screenings (1 yr)    No resulted procedures found. Medication Contract and Consent for Opioid Use Documents Filed      No documents found                 EMR Dragon/transcription disclaimer:  Much of this encounter note is electronic transcription/translation of spoken language toprinted texts. The electronic translation of spoken language may be erroneous, or at times, nonsensical words or phrases may be inadvertently transcribed.   Although I have reviewed the note for such errors, some may stillexist.

## 2022-07-18 ENCOUNTER — TELEPHONE (OUTPATIENT)
Dept: PRIMARY CARE CLINIC | Age: 67
End: 2022-07-18

## 2022-07-25 ENCOUNTER — TELEPHONE (OUTPATIENT)
Dept: PRIMARY CARE CLINIC | Age: 67
End: 2022-07-25

## 2022-07-25 DIAGNOSIS — R05.9 COUGH: Primary | ICD-10-CM

## 2022-09-19 ENCOUNTER — OFFICE VISIT (OUTPATIENT)
Dept: ENT CLINIC | Age: 67
End: 2022-09-19
Payer: MEDICARE

## 2022-09-19 VITALS
SYSTOLIC BLOOD PRESSURE: 110 MMHG | HEIGHT: 66 IN | BODY MASS INDEX: 21.05 KG/M2 | DIASTOLIC BLOOD PRESSURE: 62 MMHG | WEIGHT: 131 LBS

## 2022-09-19 DIAGNOSIS — R05.9 COUGH: ICD-10-CM

## 2022-09-19 DIAGNOSIS — R13.14 PHARYNGOESOPHAGEAL DYSPHAGIA: Primary | ICD-10-CM

## 2022-09-19 PROCEDURE — 99204 OFFICE O/P NEW MOD 45 MIN: CPT | Performed by: OTOLARYNGOLOGY

## 2022-09-19 PROCEDURE — G8427 DOCREV CUR MEDS BY ELIG CLIN: HCPCS | Performed by: OTOLARYNGOLOGY

## 2022-09-19 PROCEDURE — G8399 PT W/DXA RESULTS DOCUMENT: HCPCS | Performed by: OTOLARYNGOLOGY

## 2022-09-19 PROCEDURE — 3017F COLORECTAL CA SCREEN DOC REV: CPT | Performed by: OTOLARYNGOLOGY

## 2022-09-19 PROCEDURE — 1090F PRES/ABSN URINE INCON ASSESS: CPT | Performed by: OTOLARYNGOLOGY

## 2022-09-19 PROCEDURE — G8420 CALC BMI NORM PARAMETERS: HCPCS | Performed by: OTOLARYNGOLOGY

## 2022-09-19 PROCEDURE — 1036F TOBACCO NON-USER: CPT | Performed by: OTOLARYNGOLOGY

## 2022-09-19 PROCEDURE — 1123F ACP DISCUSS/DSCN MKR DOCD: CPT | Performed by: OTOLARYNGOLOGY

## 2022-09-19 ASSESSMENT — ENCOUNTER SYMPTOMS
GASTROINTESTINAL NEGATIVE: 1
RESPIRATORY NEGATIVE: 1
TROUBLE SWALLOWING: 1
ALLERGIC/IMMUNOLOGIC NEGATIVE: 1
EYES NEGATIVE: 1

## 2022-09-19 NOTE — PROGRESS NOTES
2022    Janet Smith (:  1955) is a 79 y.o. female, Established patient, here for evaluation of the following chief complaint(s):  New Patient (Cough)      Vitals:    22 1425   BP: 110/62   Weight: 131 lb (59.4 kg)   Height: 5' 6\" (1.676 m)       Wt Readings from Last 3 Encounters:   22 131 lb (59.4 kg)   22 131 lb (59.4 kg)   22 134 lb (60.8 kg)       BP Readings from Last 3 Encounters:   22 110/62   22 (!) 96/52   05/10/22 110/70         SUBJECTIVE/OBJECTIVE:    Patient seen today for chronic cough. Patient states that a few years ago she noted that she coughed after drinking wine at [employer]. Since that time every time she eats or drinks she has a cough. She can have several coughs after this. She was placed on reflux medications recently and they did not help. She denies any allergies or postnasal drip. Review of Systems   Constitutional: Negative. HENT:  Positive for trouble swallowing. Eyes: Negative. Respiratory: Negative. Cardiovascular: Negative. Gastrointestinal: Negative. Endocrine: Negative. Musculoskeletal: Negative. Skin: Negative. Allergic/Immunologic: Negative. Neurological: Negative. Hematological: Negative. Psychiatric/Behavioral: Negative. Physical Exam  Vitals reviewed. Constitutional:       Appearance: Normal appearance. She is normal weight. HENT:      Head: Normocephalic and atraumatic. Right Ear: Tympanic membrane, ear canal and external ear normal.      Left Ear: Tympanic membrane, ear canal and external ear normal.      Nose: Nose normal.      Mouth/Throat:      Mouth: Mucous membranes are moist.      Pharynx: Oropharynx is clear. Eyes:      Extraocular Movements: Extraocular movements intact. Pupils: Pupils are equal, round, and reactive to light. Cardiovascular:      Rate and Rhythm: Normal rate and regular rhythm.    Pulmonary:      Effort: Pulmonary effort is normal.      Breath sounds: Normal breath sounds. Musculoskeletal:      Cervical back: Normal range of motion. Skin:     General: Skin is warm and dry. Neurological:      General: No focal deficit present. Mental Status: She is alert and oriented to person, place, and time. Psychiatric:         Mood and Affect: Mood normal.         Behavior: Behavior normal.            ASSESSMENT/PLAN:    1. Pharyngoesophageal dysphagia  -     FL MODIFIED BARIUM SWALLOW W VIDEO; Future  2. Cough  It sounds like she is activating the reflexive cough when liquid and foods travel the wrong way. Onset of her modified barium swallow to evaluate this. Return in about 4 weeks (around 10/17/2022). An electronic signature was used to authenticate this note. Juliana Pompa MD       Please note that this chart was generated using dragon dictation software. Although every effort was made to ensure the accuracy of this automated transcription, some errors in transcription may have occurred.

## 2022-10-05 ENCOUNTER — TELEPHONE (OUTPATIENT)
Dept: ENT CLINIC | Age: 67
End: 2022-10-05

## 2022-10-05 NOTE — TELEPHONE ENCOUNTER
Returned pt call. Advised to call OP Speech Therapy to schedule her swallow study as I am unable to schedule this procedure.

## 2022-10-06 DIAGNOSIS — Z78.0 POST-MENOPAUSAL: Primary | ICD-10-CM

## 2022-10-07 ENCOUNTER — TRANSCRIBE ORDERS (OUTPATIENT)
Dept: ADMINISTRATIVE | Facility: HOSPITAL | Age: 67
End: 2022-10-07

## 2022-10-07 DIAGNOSIS — Z12.31 ENCOUNTER FOR SCREENING MAMMOGRAM FOR MALIGNANT NEOPLASM OF BREAST: Primary | ICD-10-CM

## 2022-10-26 ENCOUNTER — OFFICE VISIT (OUTPATIENT)
Dept: PRIMARY CARE CLINIC | Age: 67
End: 2022-10-26
Payer: MEDICARE

## 2022-10-26 VITALS
HEART RATE: 74 BPM | DIASTOLIC BLOOD PRESSURE: 70 MMHG | HEIGHT: 66 IN | OXYGEN SATURATION: 98 % | SYSTOLIC BLOOD PRESSURE: 110 MMHG | WEIGHT: 133 LBS | BODY MASS INDEX: 21.38 KG/M2 | RESPIRATION RATE: 16 BRPM | TEMPERATURE: 97.9 F

## 2022-10-26 DIAGNOSIS — R52 BODY ACHES: ICD-10-CM

## 2022-10-26 DIAGNOSIS — J06.9 VIRAL UPPER RESPIRATORY TRACT INFECTION: Primary | ICD-10-CM

## 2022-10-26 LAB
INFLUENZA A ANTIBODY: NEGATIVE
INFLUENZA B ANTIBODY: NEGATIVE
SARS-COV-2, PCR: NOT DETECTED

## 2022-10-26 PROCEDURE — 3017F COLORECTAL CA SCREEN DOC REV: CPT | Performed by: FAMILY MEDICINE

## 2022-10-26 PROCEDURE — 99213 OFFICE O/P EST LOW 20 MIN: CPT | Performed by: FAMILY MEDICINE

## 2022-10-26 PROCEDURE — 1090F PRES/ABSN URINE INCON ASSESS: CPT | Performed by: FAMILY MEDICINE

## 2022-10-26 PROCEDURE — 87804 INFLUENZA ASSAY W/OPTIC: CPT | Performed by: FAMILY MEDICINE

## 2022-10-26 PROCEDURE — G8420 CALC BMI NORM PARAMETERS: HCPCS | Performed by: FAMILY MEDICINE

## 2022-10-26 PROCEDURE — G8427 DOCREV CUR MEDS BY ELIG CLIN: HCPCS | Performed by: FAMILY MEDICINE

## 2022-10-26 PROCEDURE — 1123F ACP DISCUSS/DSCN MKR DOCD: CPT | Performed by: FAMILY MEDICINE

## 2022-10-26 PROCEDURE — 1036F TOBACCO NON-USER: CPT | Performed by: FAMILY MEDICINE

## 2022-10-26 PROCEDURE — G8484 FLU IMMUNIZE NO ADMIN: HCPCS | Performed by: FAMILY MEDICINE

## 2022-10-26 PROCEDURE — G8399 PT W/DXA RESULTS DOCUMENT: HCPCS | Performed by: FAMILY MEDICINE

## 2022-10-26 ASSESSMENT — ENCOUNTER SYMPTOMS
CHEST TIGHTNESS: 0
ABDOMINAL PAIN: 0
COUGH: 1
BLOOD IN STOOL: 0
WHEEZING: 0
RHINORRHEA: 1
SINUS PAIN: 0
NAUSEA: 0
DIARRHEA: 0
SHORTNESS OF BREATH: 0
VOMITING: 0

## 2022-10-26 NOTE — PROGRESS NOTES
Janet Smith (:  1955) is a 79 y.o. female,Established patient, here for evaluation of the following chief complaint(s):  Pharyngitis and Generalized Body Aches (No fever, drainage, slight cough started Monday no exposure)         ASSESSMENT/PLAN:  1. Viral upper respiratory tract infection  2. Body aches  -     POCT Influenza A/B  -     COVID-19    Flu swab completed in office and negative at this time. We will follow this up with a COVID. Given the fact that patient has had body aches it is more likely that there is some viral component. Patient advised to remain hydrated and to utilize OTC medications for symptomatic management. Patient advised that she should remain quarantined for at least 5 days if COVID is positive and should remain quarantine for the time until test returns regardless. Many illnesses are caused by viruses. These conditions usually run their course in 7-14 days. Antibiotics do not help fight viral infections and are not needed at this time. Viral syndromes are treated with symptomatic support. You may take tylenol or ibuprofen for fever or aches and pains. Stay hydrated by taking sips of water or non caffeinated, noncarbonated, and nonalcoholic beverages throughout the day. For sore throat, you may gargle with warm salt water, use lozenges or sprays. Using a daily antihistamine such as Claritin, Zyrtec or Allegra can help with upper respiratory symptoms. Benadryl can be sedating but is helpful at drying secretions and may be taken at night. Call if you have a fever greater than 102 F or if symptoms do not improve. Your provider may also send you in prescription medications depending on your symptoms and their severity. Take all medications as directed on package unless specifically told otherwise. Return if symptoms worsen or fail to improve.          Subjective   SUBJECTIVE/OBJECTIVE:  Alisson Cee is a 79 y.o. female who presents due to sore throat and body aches. Patient says that her symptoms started on Monday and have been accompanied by some ear pain. Patient denies any fever. Patient denies any known sick contacts. Patient has had some congestion and drainage. Patient has been taking Tylenol and Advil which have been somewhat helpful      Pharyngitis  This is a new problem. Associated symptoms include congestion, coughing (no change from baseline) and fatigue. Pertinent negatives include no abdominal pain, chest pain, chills, fever, headaches, nausea, vomiting or weakness. Generalized Body Aches  Associated symptoms include congestion, coughing (no change from baseline) and fatigue. Pertinent negatives include no abdominal pain, chest pain, chills, fever, headaches, nausea, vomiting or weakness. Review of Systems   Constitutional:  Positive for fatigue. Negative for activity change, chills and fever. HENT:  Positive for congestion and rhinorrhea. Negative for sinus pain. Eyes:  Negative for visual disturbance. Respiratory:  Positive for cough (no change from baseline). Negative for chest tightness, shortness of breath and wheezing. Cardiovascular:  Negative for chest pain. Gastrointestinal:  Negative for abdominal pain, blood in stool, diarrhea, nausea and vomiting. Genitourinary:  Negative for difficulty urinating and urgency. Neurological:  Negative for weakness and headaches. Psychiatric/Behavioral:  Negative for confusion. Objective   Physical Exam  Vitals reviewed. Constitutional:       General: She is not in acute distress. Appearance: Normal appearance. She is not ill-appearing. HENT:      Head: Normocephalic and atraumatic. Cardiovascular:      Rate and Rhythm: Normal rate and regular rhythm. Pulses: Normal pulses. Heart sounds: Normal heart sounds. No murmur heard. Pulmonary:      Effort: Pulmonary effort is normal. No respiratory distress. Breath sounds: Normal breath sounds.  No wheezing or rhonchi. Chest:      Chest wall: No tenderness. Abdominal:      General: Abdomen is flat. Bowel sounds are normal. There is no distension. Palpations: Abdomen is soft. Tenderness: There is no abdominal tenderness. Musculoskeletal:         General: No swelling. Right lower leg: No edema. Left lower leg: No edema. Skin:     General: Skin is warm and dry. Neurological:      General: No focal deficit present. Mental Status: She is alert. Vitals:    10/26/22 1342   BP: 110/70   Pulse: 74   Resp: 16   Temp: 97.9 °F (36.6 °C)   SpO2: 98%        Current Outpatient Medications   Medication Sig Dispense Refill    escitalopram (LEXAPRO) 20 MG tablet Take 1 tablet by mouth daily 90 tablet 1    Cholecalciferol (VITAMIN D3) 50 MCG (2000 UT) CAPS Take by mouth      Calcium Carb-Cholecalciferol (CALCIUM 1000 + D PO) Take by mouth      Biotin 10 MG tablet Take 10 mg by mouth daily      COLLAGEN PO Take by mouth      magnesium 200 MG TABS tablet Take 200 mg by mouth 2 times daily      DHEA 25 MG CAPS Take 1 capsule by mouth daily      Glucosamine Sulfate 1000 MG CAPS Take 1 capsule by mouth 2 times daily      nitrofurantoin (MACRODANTIN) 50 MG capsule Take 50 mg by mouth as needed      Emollient (RA MOISTURIZING CREAM EX) Apply 1 Dose topically daily      Multiple Vitamins-Minerals (CENTRUM SILVER 50+WOMEN PO) Take 1 tablet by mouth daily      aspirin 81 MG EC tablet Take 81 mg by mouth as needed (M, W and F)       LUTEIN PO Take 1 tablet by mouth daily      Hyaluronic Acid-Vitamin C (HYALURONIC ACID PO) Take 1 tablet by mouth daily      omeprazole (PRILOSEC) 40 MG delayed release capsule Take 1 capsule by mouth every morning (before breakfast) (Patient not taking: No sig reported) 30 capsule 5     No current facility-administered medications for this visit.       Family History   Problem Relation Age of Onset    Coronary Art Dis Mother     Prostate Cancer Father     Coronary Art Dis Father Diabetes type 2  Father     Arthritis Sister     Glaucoma Sister       Past Medical History:   Diagnosis Date    Arthralgia     MANOLO (generalized anxiety disorder)     MANOLO (generalized anxiety disorder)     Hyperlipidemia     Osteopenia       Past Surgical History:   Procedure Laterality Date    COLONOSCOPY N/A 9/24/2020    Dr Maty Hankins, 10 yr recall    HYSTERECTOMY (CERVIX STATUS UNKNOWN)  2010    LUMBAR DISCECTOMY  2015    LUMBAR LAMINECTOMY  2014    OVARIAN CYST REMOVAL  1978      No Known Allergies     Lab Results   Component Value Date     05/10/2021    K 4.2 05/10/2021     05/10/2021    CO2 28 05/10/2021    BUN 16 05/10/2021    CREATININE 0.7 05/10/2021    GLUCOSE 81 05/10/2021    CALCIUM 9.8 05/10/2021    PROT 7.5 05/10/2021    LABALBU 4.5 05/10/2021    BILITOT 0.3 05/10/2021    ALKPHOS 39 05/10/2021    AST 19 05/10/2021    ALT 13 05/10/2021    LABGLOM >60 05/10/2021    GFRAA >59 05/10/2021        Lab Results   Component Value Date    WBC 4.7 (L) 05/10/2021    HGB 12.2 05/10/2021    HCT 38.3 05/10/2021    MCV 94.8 05/10/2021     05/10/2021                EMR Dragon/transcription disclaimer:  Much of this encounter note is electronic transcription/translation of spoken language toprinted texts. The electronic translation of spoken language may be erroneous, or at times, nonsensical words or phrases may be inadvertently transcribed. Although I have reviewed the note for such errors, some may stillexist.      An electronic signature was used to authenticate this note.     --Flakito Arana MD

## 2022-10-28 ENCOUNTER — HOSPITAL ENCOUNTER (OUTPATIENT)
Dept: GENERAL RADIOLOGY | Age: 67
Discharge: HOME | End: 2022-10-28
Payer: MEDICARE

## 2022-10-28 ENCOUNTER — HOSPITAL ENCOUNTER (OUTPATIENT)
Dept: SPEECH THERAPY | Age: 67
Setting detail: THERAPIES SERIES
Discharge: HOME | End: 2022-10-28
Payer: MEDICARE

## 2022-10-28 DIAGNOSIS — R13.14 PHARYNGOESOPHAGEAL DYSPHAGIA: ICD-10-CM

## 2022-10-28 PROCEDURE — 74230 X-RAY XM SWLNG FUNCJ C+: CPT

## 2022-10-28 PROCEDURE — 92611 MOTION FLUOROSCOPY/SWALLOW: CPT

## 2022-10-28 NOTE — PROCEDURES
INSTRUMENTAL SWALLOW REPORT  MODIFIED BARIUM SWALLOW    NAME: Beth Armendariz     : 1955  MRN: [id number]     Date of Eval: 10/28/2022     Ordering Physician: Dr. Cathleen Patel  Radiologist: Dr. Ina Rai      Referring Diagnosis(es):   Pharyngoesophageal Dysphagia R13.14    Past Medical History:    has a past medical history of Arthralgia, MANOLO (generalized anxiety disorder), MANOLO (generalized anxiety disorder), Hyperlipidemia, and Osteopenia. Past Surgical History:    has a past surgical history that includes Hysterectomy (); ovarian cyst removal (); lumbar laminectomy (); lumbar discectomy (); and Colonoscopy (N/A, 2020). Type of Study:   Initial MBS      Patient Complaints/Reason for Referral:  Beth Armendariz was referred for a MBS to assess the efficiency of his/her swallow function, assess for aspiration, and to make recommendations regarding safe dietary consistencies, effective compensatory strategies, and safe eating environment. She reports a history difficulty swallowing liquids and foods which has been getting progressively worse. She states she has difficulty every day and with every meal.     Behavior/Cognition/Vision/Hearing:  Behavior/Cognition: Alert; Cooperative;Pleasant mood    Impressions:  No aspiration was observed this study. Premature loss of the bolus over the tongue base was noted with thin and mildly nectar thick liquids. She did exhibit trace silent laryngeal penetration with thin liquids via cup. One episode of trace laryngeal penetration was noted with thin liquids via cup while utilizing a chin tuck. A left head turn with thin liquids did not result in laryngeal penetration. She is recommended to continue a regular diet with thin liquids via cup. She should avoid straws. She should eat and drink slowly. She should take small bites and sips. She should remain upright for 30 minutes following all meals. Recommend repeat MBSS if her dysphagia worsens. Recommend consider outpatient dysphagia treatment for pharyngeal strengthening. Patient Position:   Lateral (90 Degrees)      Consistencies Administered:   Regular; Soft & Bite Sized; Thin cup;Mildly Thick cup;Pureed (Pudding via tsp)    Thin liquids via cup resulted in premature loss of the bolus over the tongue base. A trace amount of silent laryngeal penetration was observed before the swallow with consecutive sips. This was ejected from the laryngeal vestibule with swallow completion. Minimal vallecular and trace pyriform sinus residue was noted. A dry swallow did clear some residue and only trace vallecular residue remained. Thin liquids via cup with a chin tuck resulted in trace vallecular residue. Question trace laryngeal penetration with the second presentation of thin liquids via cup with a chin tuck. Minimal pharyngeal residue was noted which cleared with an independent dry swallow. Nectar thick liquids via cup were presented twice and resulted in premature loss of the bolus over the tongue base to the valleculae then pyriforms. Min to moderate vallecular and pyriform residue was noted. After dry swallows only trace vallecular and pyriform residue was noted. Puree revealed trace base of tongue, vallecular and pyriform residue. Pudding revealed minimal vallecular residue. Lingual pumping was also noted. Mechanical soft and solids did not result in any pharyngeal residue. Thin liquids via cup with a left head turn revealed min to moderate vallecular residue. Thin liquids via cup with a chin tuck resulted in trace silent laryngeal penetration with ejection from the airway. Minimal vallecular and pyriform residue was noted. Decreased hyolaryngeal elevation, incomplete epiglottic inversion noted at times, and decreased anterior hyoid excursion was also noted. Dysphagia Outcome Severity Scale:   Level 5: Mild dysphagia- Distant supervision.  May need one diet consistency restricted    Penetration-Aspiration Scale (PAS):   2 - Material enters the airway, remains above the vocal folds, and is ejected from the airway    Recommended Diet:  Solid consistency: Regular  Liquid consistency: Thin  Liquid administration via: Cup (Avoid straws)    Medication administration:  (Medications whole one at a time with water)    Safe Swallow Protocol:  Compensatory Swallowing Strategies :   Upright as possible for all oral intake  Remain upright for 30-45 minutes after meals  Small bites/sips  Eat/Feed slowly  No straws    Recommendations/Treatment  Requires SLP Intervention: Yes    Recommendations comment:   Follow up modified barium swallow study if her dysphagia worsens. Recommend consider outpatient dysphagia treatment for pharyngeal strengthening exercises    D/C Recommendations:   Ongoing speech therapy is recommended at next level of care    Recommended Exercises:    Therapeutic Interventions: Pharyngeal exercises;Oral care    Education:   Images and recommendations were reviewed with the patient following this exam.   Patient Education: Skilled education was provided regarding swallowing anatomy and physiology, trace laryngeal penetration, diet recommendations, swallowing precautions, and recommendations for outpatient speech therapy for pharyngeal strengthening.   Patient Education Response: Verbalizes understanding          Esophageal Phase  Esophageal Screen: [facility]                10/28/2022, 1:42 PM        Electronically Signed By:  Aury Torres M.S. CCC-SLP  10/28/2022,1:45 PM.

## 2022-11-07 ENCOUNTER — OFFICE VISIT (OUTPATIENT)
Dept: ENT CLINIC | Age: 67
End: 2022-11-07
Payer: MEDICARE

## 2022-11-07 ENCOUNTER — HOSPITAL ENCOUNTER (OUTPATIENT)
Dept: ULTRASOUND IMAGING | Age: 67
Discharge: HOME | End: 2022-11-07
Payer: MEDICARE

## 2022-11-07 VITALS
BODY MASS INDEX: 21.53 KG/M2 | HEIGHT: 66 IN | SYSTOLIC BLOOD PRESSURE: 118 MMHG | WEIGHT: 134 LBS | DIASTOLIC BLOOD PRESSURE: 68 MMHG

## 2022-11-07 DIAGNOSIS — R13.14 PHARYNGOESOPHAGEAL DYSPHAGIA: Primary | ICD-10-CM

## 2022-11-07 DIAGNOSIS — Z78.0 POST-MENOPAUSAL: ICD-10-CM

## 2022-11-07 PROCEDURE — 1090F PRES/ABSN URINE INCON ASSESS: CPT | Performed by: OTOLARYNGOLOGY

## 2022-11-07 PROCEDURE — 77080 DXA BONE DENSITY AXIAL: CPT

## 2022-11-07 PROCEDURE — G8427 DOCREV CUR MEDS BY ELIG CLIN: HCPCS | Performed by: OTOLARYNGOLOGY

## 2022-11-07 PROCEDURE — 1036F TOBACCO NON-USER: CPT | Performed by: OTOLARYNGOLOGY

## 2022-11-07 PROCEDURE — 99213 OFFICE O/P EST LOW 20 MIN: CPT | Performed by: OTOLARYNGOLOGY

## 2022-11-07 PROCEDURE — G8399 PT W/DXA RESULTS DOCUMENT: HCPCS | Performed by: OTOLARYNGOLOGY

## 2022-11-07 PROCEDURE — 1123F ACP DISCUSS/DSCN MKR DOCD: CPT | Performed by: OTOLARYNGOLOGY

## 2022-11-07 PROCEDURE — G8420 CALC BMI NORM PARAMETERS: HCPCS | Performed by: OTOLARYNGOLOGY

## 2022-11-07 PROCEDURE — 3017F COLORECTAL CA SCREEN DOC REV: CPT | Performed by: OTOLARYNGOLOGY

## 2022-11-07 PROCEDURE — G8484 FLU IMMUNIZE NO ADMIN: HCPCS | Performed by: OTOLARYNGOLOGY

## 2022-11-07 NOTE — PROGRESS NOTES
2022    Janet Smith (:  1955) is a 79 y.o. female, Established patient, here for evaluation of the following chief complaint(s):  Follow-up (Dysphagia)      Vitals:    22 1434   BP: 118/68   Weight: 134 lb (60.8 kg)   Height: 5' 6\" (1.676 m)       Wt Readings from Last 3 Encounters:   22 134 lb (60.8 kg)   10/26/22 133 lb (60.3 kg)   22 131 lb (59.4 kg)       BP Readings from Last 3 Encounters:   22 118/68   10/26/22 110/70   22 110/62         SUBJECTIVE/OBJECTIVE:    Patient seen today for dysphagia. I set her up for a swallow study which shows that she does have some silent aspiration. Speech therapy gave her some techniques to help with this including taking small sips and tucking her chin. She has not taken her chin but she is taking small sips. She was recommended to have swallow therapy. Review of Systems   Constitutional: Negative. HENT:  Positive for trouble swallowing. Eyes: Negative. Respiratory: Negative. Cardiovascular: Negative. Gastrointestinal: Negative. Endocrine: Negative. Musculoskeletal: Negative. Skin: Negative. Allergic/Immunologic: Negative. Neurological: Negative. Hematological: Negative. Psychiatric/Behavioral: Negative. Physical Exam  Vitals reviewed. Constitutional:       Appearance: Normal appearance. She is normal weight. HENT:      Head: Normocephalic and atraumatic. Right Ear: Tympanic membrane, ear canal and external ear normal.      Left Ear: Tympanic membrane, ear canal and external ear normal.      Nose: Nose normal.      Mouth/Throat:      Mouth: Mucous membranes are moist.      Pharynx: Oropharynx is clear. Eyes:      Extraocular Movements: Extraocular movements intact. Pupils: Pupils are equal, round, and reactive to light. Cardiovascular:      Rate and Rhythm: Normal rate and regular rhythm.    Pulmonary:      Effort: Pulmonary effort is normal.      Breath sounds: Normal breath sounds. Musculoskeletal:      Cervical back: Normal range of motion. Skin:     General: Skin is warm and dry. Neurological:      General: No focal deficit present. Mental Status: She is alert and oriented to person, place, and time. Psychiatric:         Mood and Affect: Mood normal.         Behavior: Behavior normal.            ASSESSMENT/PLAN:    1. Pharyngoesophageal dysphagia  The patient did have some silent aspiration and she is doing small sips. I think she would benefit from therapy to get better. Follow-up as needed. Return if symptoms worsen or fail to improve. An electronic signature was used to authenticate this note. Jose M Price MD       Please note that this chart was generated using dragon dictation software. Although every effort was made to ensure the accuracy of this automated transcription, some errors in transcription may have occurred.

## 2022-11-10 RX ORDER — ESCITALOPRAM OXALATE 20 MG/1
TABLET ORAL
Qty: 90 TABLET | Refills: 1 | Status: SHIPPED | OUTPATIENT
Start: 2022-11-10

## 2022-11-14 ENCOUNTER — NURSE ONLY (OUTPATIENT)
Dept: PRIMARY CARE CLINIC | Age: 67
End: 2022-11-14

## 2022-11-14 ENCOUNTER — TELEPHONE (OUTPATIENT)
Dept: PRIMARY CARE CLINIC | Age: 67
End: 2022-11-14

## 2022-11-14 DIAGNOSIS — E78.5 HYPERLIPIDEMIA, UNSPECIFIED HYPERLIPIDEMIA TYPE: Primary | ICD-10-CM

## 2022-11-14 DIAGNOSIS — Z00.00 WELLNESS EXAMINATION: ICD-10-CM

## 2022-11-14 LAB
ALBUMIN SERPL-MCNC: 4.5 G/DL (ref 3.5–5.2)
ALP BLD-CCNC: 41 U/L (ref 35–104)
ALT SERPL-CCNC: 15 U/L (ref 5–33)
ANION GAP SERPL CALCULATED.3IONS-SCNC: 7 MMOL/L (ref 7–19)
AST SERPL-CCNC: 21 U/L (ref 5–32)
BASOPHILS ABSOLUTE: 0 K/UL (ref 0–0.2)
BASOPHILS RELATIVE PERCENT: 0.5 % (ref 0–1)
BILIRUB SERPL-MCNC: 0.3 MG/DL (ref 0.2–1.2)
BUN BLDV-MCNC: 18 MG/DL (ref 8–23)
CALCIUM SERPL-MCNC: 9.9 MG/DL (ref 8.8–10.2)
CHLORIDE BLD-SCNC: 101 MMOL/L (ref 98–111)
CHOLESTEROL, TOTAL: 190 MG/DL (ref 160–199)
CO2: 30 MMOL/L (ref 22–29)
CREAT SERPL-MCNC: 0.7 MG/DL (ref 0.5–0.9)
EOSINOPHILS ABSOLUTE: 0.2 K/UL (ref 0–0.6)
EOSINOPHILS RELATIVE PERCENT: 3.4 % (ref 0–5)
GFR SERPL CREATININE-BSD FRML MDRD: >60 ML/MIN/{1.73_M2}
GLUCOSE BLD-MCNC: 73 MG/DL (ref 74–109)
HCT VFR BLD CALC: 38 % (ref 37–47)
HDLC SERPL-MCNC: 73 MG/DL (ref 65–121)
HEMOGLOBIN: 12.2 G/DL (ref 12–16)
IMMATURE GRANULOCYTES #: 0 K/UL
LDL CHOLESTEROL CALCULATED: 108 MG/DL
LYMPHOCYTES ABSOLUTE: 1 K/UL (ref 1.1–4.5)
LYMPHOCYTES RELATIVE PERCENT: 17.3 % (ref 20–40)
MCH RBC QN AUTO: 30 PG (ref 27–31)
MCHC RBC AUTO-ENTMCNC: 32.1 G/DL (ref 33–37)
MCV RBC AUTO: 93.6 FL (ref 81–99)
MONOCYTES ABSOLUTE: 0.4 K/UL (ref 0–0.9)
MONOCYTES RELATIVE PERCENT: 7.3 % (ref 0–10)
NEUTROPHILS ABSOLUTE: 4.2 K/UL (ref 1.5–7.5)
NEUTROPHILS RELATIVE PERCENT: 71.3 % (ref 50–65)
PDW BLD-RTO: 13.3 % (ref 11.5–14.5)
PLATELET # BLD: 318 K/UL (ref 130–400)
PMV BLD AUTO: 9 FL (ref 9.4–12.3)
POTASSIUM SERPL-SCNC: 4.6 MMOL/L (ref 3.5–5)
RBC # BLD: 4.06 M/UL (ref 4.2–5.4)
SODIUM BLD-SCNC: 138 MMOL/L (ref 136–145)
T4 FREE: 0.81 NG/DL (ref 0.93–1.7)
TOTAL PROTEIN: 7.1 G/DL (ref 6.6–8.7)
TRIGL SERPL-MCNC: 44 MG/DL (ref 0–149)
TSH SERPL DL<=0.05 MIU/L-ACNC: 5.24 UIU/ML (ref 0.27–4.2)
WBC # BLD: 5.9 K/UL (ref 4.8–10.8)

## 2022-11-14 NOTE — TELEPHONE ENCOUNTER
----- Message from Mario Spangler sent at 11/14/2022 11:16 AM CST -----  Subject: Referral Request    Reason for referral request? Patient is requesting a referral for a   specialist that treats osteoporosis. Please contact patient when this is   approved and ready for scheduling. Provider patient wants to be referred to(if known):     Provider Phone Number(if known):     Additional Information for Provider?   ---------------------------------------------------------------------------  --------------  [id number]    [phone]; OK to leave message on voicemail  ---------------------------------------------------------------------------  --------------

## 2022-11-17 ENCOUNTER — OFFICE VISIT (OUTPATIENT)
Dept: PRIMARY CARE CLINIC | Age: 67
End: 2022-11-17
Payer: MEDICARE

## 2022-11-17 VITALS
RESPIRATION RATE: 16 BRPM | DIASTOLIC BLOOD PRESSURE: 64 MMHG | OXYGEN SATURATION: 97 % | HEIGHT: 66 IN | HEART RATE: 72 BPM | WEIGHT: 132.6 LBS | BODY MASS INDEX: 21.31 KG/M2 | TEMPERATURE: 97 F | SYSTOLIC BLOOD PRESSURE: 110 MMHG

## 2022-11-17 DIAGNOSIS — R79.89 ABNORMAL THYROID BLOOD TEST: ICD-10-CM

## 2022-11-17 DIAGNOSIS — M81.0 OSTEOPOROSIS WITHOUT CURRENT PATHOLOGICAL FRACTURE, UNSPECIFIED OSTEOPOROSIS TYPE: ICD-10-CM

## 2022-11-17 DIAGNOSIS — Z78.0 POST-MENOPAUSAL: Primary | ICD-10-CM

## 2022-11-17 PROCEDURE — G8420 CALC BMI NORM PARAMETERS: HCPCS | Performed by: FAMILY MEDICINE

## 2022-11-17 PROCEDURE — 3017F COLORECTAL CA SCREEN DOC REV: CPT | Performed by: FAMILY MEDICINE

## 2022-11-17 PROCEDURE — G8427 DOCREV CUR MEDS BY ELIG CLIN: HCPCS | Performed by: FAMILY MEDICINE

## 2022-11-17 PROCEDURE — 1036F TOBACCO NON-USER: CPT | Performed by: FAMILY MEDICINE

## 2022-11-17 PROCEDURE — 99214 OFFICE O/P EST MOD 30 MIN: CPT | Performed by: FAMILY MEDICINE

## 2022-11-17 PROCEDURE — G8484 FLU IMMUNIZE NO ADMIN: HCPCS | Performed by: FAMILY MEDICINE

## 2022-11-17 PROCEDURE — 1123F ACP DISCUSS/DSCN MKR DOCD: CPT | Performed by: FAMILY MEDICINE

## 2022-11-17 PROCEDURE — 1090F PRES/ABSN URINE INCON ASSESS: CPT | Performed by: FAMILY MEDICINE

## 2022-11-17 PROCEDURE — G8399 PT W/DXA RESULTS DOCUMENT: HCPCS | Performed by: FAMILY MEDICINE

## 2022-11-18 ENCOUNTER — TELEPHONE (OUTPATIENT)
Dept: PRIMARY CARE CLINIC | Age: 67
End: 2022-11-18

## 2022-11-18 DIAGNOSIS — M81.0 AGE-RELATED OSTEOPOROSIS WITHOUT CURRENT PATHOLOGICAL FRACTURE: Primary | ICD-10-CM

## 2022-11-18 RX ORDER — DIPHENHYDRAMINE HYDROCHLORIDE 50 MG/ML
50 INJECTION INTRAMUSCULAR; INTRAVENOUS
OUTPATIENT
Start: 2022-11-18

## 2022-11-18 RX ORDER — EPINEPHRINE 1 MG/ML
0.3 INJECTION, SOLUTION, CONCENTRATE INTRAVENOUS PRN
OUTPATIENT
Start: 2022-11-18

## 2022-11-18 RX ORDER — ALBUTEROL SULFATE 90 UG/1
4 AEROSOL, METERED RESPIRATORY (INHALATION) PRN
OUTPATIENT
Start: 2022-11-18

## 2022-11-18 RX ORDER — ONDANSETRON 2 MG/ML
8 INJECTION INTRAMUSCULAR; INTRAVENOUS
OUTPATIENT
Start: 2022-11-18

## 2022-11-18 RX ORDER — SODIUM CHLORIDE 9 MG/ML
INJECTION, SOLUTION INTRAVENOUS CONTINUOUS
OUTPATIENT
Start: 2022-11-18

## 2022-11-18 RX ORDER — ACETAMINOPHEN 325 MG/1
650 TABLET ORAL
OUTPATIENT
Start: 2022-11-18

## 2022-11-18 NOTE — PROGRESS NOTES
Per Dr. Claudia Monk, therapy plan for Prolia 60mg created, telephone with readback. Patient to be scheduled as soon as prior authorization is obtained. CMP completed on 11/14/2022, Ca WNL for patient to receive as soon as possible.  Electronically signed by Chris Cabral RN on 11/18/2022 at 1:27 PM

## 2022-11-18 NOTE — TELEPHONE ENCOUNTER
----- Message from Han Collado MD sent at 11/18/2022  8:44 AM CST -----  Can you get her set up for prolia through the infusion center at [facility]?

## 2022-11-18 NOTE — PROGRESS NOTES
SUBJECTIVE:    Patient ID: Jaimee Lemon is a 79 y.o. female. HPI:   Patient is seen today to discuss her recent bone density test that she had done. She has had several bone density test that showed osteoporosis and she was on Boniva for a while but states that she took this for more than 5 years and so she cannot take that again. She is concerned about her osteoporosis because she has a friend who is in the nursing home due to osteoporosis and fractures. She states that she would like to discuss options for the osteoporosis. She states that she has never done an injectable such as Prolia. She states that she would be interested in that. She states that she also has had some cough and congestion. She does feel like this is clearing up. She states that she has had labs done recently as well which showed some thyroid abnormalities. She states that she has never had issues with her thyroid previously. She is never been on thyroid medication. She denies any significant thyroid symptoms. She states that she would like to possibly hold off on starting anything for the thyroid at this time. We did discuss option of doing medication versus not and she states that she would like to hold off for now.     Past Medical History:   Diagnosis Date    Arthralgia     MANOLO (generalized anxiety disorder)     MANOLO (generalized anxiety disorder)     Hyperlipidemia     Osteopenia       Current Outpatient Medications on File Prior to Visit   Medication Sig Dispense Refill    escitalopram (LEXAPRO) 20 MG tablet TAKE 1 TABLET BY MOUTH EVERY DAY 90 tablet 1    Cholecalciferol (VITAMIN D3) 50 MCG (2000 UT) CAPS Take by mouth      Calcium Carb-Cholecalciferol (CALCIUM 1000 + D PO) Take by mouth      Biotin 10 MG tablet Take 10 mg by mouth daily      COLLAGEN PO Take by mouth      magnesium 200 MG TABS tablet Take 200 mg by mouth 2 times daily      DHEA 25 MG CAPS Take 1 capsule by mouth daily      Glucosamine Sulfate 1000 MG CAPS Take 1 capsule by mouth 2 times daily      nitrofurantoin (MACRODANTIN) 50 MG capsule Take 50 mg by mouth as needed      Emollient (RA MOISTURIZING CREAM EX) Apply 1 Dose topically daily      Multiple Vitamins-Minerals (CENTRUM SILVER 50+WOMEN PO) Take 1 tablet by mouth daily      aspirin 81 MG EC tablet Take 81 mg by mouth as needed (M, W and F)       LUTEIN PO Take 1 tablet by mouth daily      Hyaluronic Acid-Vitamin C (HYALURONIC ACID PO) Take 1 tablet by mouth daily      omeprazole (PRILOSEC) 40 MG delayed release capsule Take 1 capsule by mouth every morning (before breakfast) (Patient not taking: No sig reported) 30 capsule 5     No current facility-administered medications on file prior to visit. No Known Allergies    Review of Systems    OBJECTIVE:    Physical Exam   /64   Pulse 72   Temp 97 °F (36.1 °C) (Temporal)   Resp 16   Ht 5' 6\" (1.676 m)   Wt 132 lb 9.6 oz (60.1 kg)   SpO2 97%   BMI 21.40 kg/m²      ASSESSMENT/PLAN:    1. Post-menopausal  2. Osteoporosis without current pathological fracture, unspecified osteoporosis type  3. Abnormal thyroid blood test     We are going to send an order over to the infusion center for Prolia to go ahead and get her started on this. We will plan to repeat a bone density in 1 year. We did look at her thyroid levels today. We are going to hold off on starting any thyroid medication at this time. We will recheck her levels when she returns from [place] in April. If they are still off at that point or she develops symptoms sooner she is to let us know we will possibly look at starting the medication at that time.     Lab Review   Nurse Only on 11/14/2022   Component Date Value    Cholesterol, Total 11/14/2022 190     Triglycerides 11/14/2022 44     HDL 11/14/2022 73     LDL Calculated 11/14/2022 108     Sodium 11/14/2022 138     Potassium 11/14/2022 4.6     Chloride 11/14/2022 101     CO2 11/14/2022 30 (A)     Anion Gap 11/14/2022 7 Glucose 11/14/2022 73 (A)     BUN 11/14/2022 18     Creatinine 11/14/2022 0.7     Est, Glom Filt Rate 11/14/2022 >60     Calcium 11/14/2022 9.9     Total Protein 11/14/2022 7.1     Albumin 11/14/2022 4.5     Total Bilirubin 11/14/2022 0.3     Alkaline Phosphatase 11/14/2022 41     ALT 11/14/2022 15     AST 11/14/2022 21     WBC 11/14/2022 5.9     RBC 11/14/2022 4.06 (A)     Hemoglobin 11/14/2022 12.2     Hematocrit 11/14/2022 38.0     MCV 11/14/2022 93.6     MCH 11/14/2022 30.0     MCHC 11/14/2022 32.1 (A)     RDW 11/14/2022 13.3     Platelets 30/71/6195 318     MPV 11/14/2022 9.0 (A)     Neutrophils % 11/14/2022 71.3 (A)     Lymphocytes % 11/14/2022 17.3 (A)     Monocytes % 11/14/2022 7.3     Eosinophils % 11/14/2022 3.4     Basophils % 11/14/2022 0.5     Neutrophils Absolute 11/14/2022 4.2     Immature Granulocytes # 11/14/2022 0.0     Lymphocytes Absolute 11/14/2022 1.0 (A)     Monocytes Absolute 11/14/2022 0.40     Eosinophils Absolute 11/14/2022 0.20     Basophils Absolute 11/14/2022 0.00     TSH 11/14/2022 5.240 (A)     T4 Free 11/14/2022 0.81 (A)    Office Visit on 10/26/2022   Component Date Value    Influenza A Ab 10/26/2022 negative     Influenza B Ab 10/26/2022 negative     SARS-CoV-2, PCR 10/26/2022 Not Detected      Results past 30 days: DEXA BONE DENSITY AXIAL SKELETON    Result Date: 11/8/2022  1. Osteoporosis. The bone density is more than 2.5 standard deviations below the mean for a young adult woman. There is significant increased fracture risk. 2. FRAX fracture risk assessment (10 year risk): Not obtained Signed by Dr Hannah Tan MD       PDMP Monitoring:    Last PDMP Lopez hagen Reviewed [facility]):  Review User Review Instant Review Result            Urine Drug Screenings (1 yr)    No resulted procedures found.        Medication Contract and Consent for Opioid Use Documents Filed        No documents found                     EMR Dragon/transcription disclaimer:  Much of this encounter note is electronic transcription/translation of spoken language toprinted texts. The electronic translation of spoken language may be erroneous, or at times, nonsensical words or phrases may be inadvertently transcribed.   Although I have reviewed the note for such errors, some may stillexist.

## 2022-11-18 NOTE — TELEPHONE ENCOUNTER
Called [facility] infusion center and talked to Angelia. She will call patient to schedule.

## 2022-11-30 ENCOUNTER — HOSPITAL ENCOUNTER (OUTPATIENT)
Dept: INFUSION THERAPY | Age: 67
Setting detail: INFUSION SERIES
Discharge: HOME | End: 2022-11-30
Payer: MEDICARE

## 2022-11-30 ENCOUNTER — HOSPITAL ENCOUNTER (OUTPATIENT)
Dept: MAMMOGRAPHY | Facility: HOSPITAL | Age: 67
Discharge: HOME | End: 2022-11-30
Admitting: FAMILY MEDICINE

## 2022-11-30 VITALS
TEMPERATURE: 97.2 F | SYSTOLIC BLOOD PRESSURE: 111 MMHG | DIASTOLIC BLOOD PRESSURE: 70 MMHG | RESPIRATION RATE: 18 BRPM | OXYGEN SATURATION: 97 % | HEART RATE: 73 BPM

## 2022-11-30 DIAGNOSIS — M81.0 AGE-RELATED OSTEOPOROSIS WITHOUT CURRENT PATHOLOGICAL FRACTURE: Primary | ICD-10-CM

## 2022-11-30 DIAGNOSIS — Z12.31 ENCOUNTER FOR SCREENING MAMMOGRAM FOR MALIGNANT NEOPLASM OF BREAST: ICD-10-CM

## 2022-11-30 PROCEDURE — 96372 THER/PROPH/DIAG INJ SC/IM: CPT

## 2022-11-30 PROCEDURE — 77067 SCR MAMMO BI INCL CAD: CPT

## 2022-11-30 PROCEDURE — 6360000002 HC RX W HCPCS: Performed by: FAMILY MEDICINE

## 2022-11-30 PROCEDURE — 77063 BREAST TOMOSYNTHESIS BI: CPT

## 2022-11-30 RX ORDER — ONDANSETRON 2 MG/ML
8 INJECTION INTRAMUSCULAR; INTRAVENOUS
OUTPATIENT
Start: 2023-05-28

## 2022-11-30 RX ORDER — DIPHENHYDRAMINE HYDROCHLORIDE 50 MG/ML
50 INJECTION INTRAMUSCULAR; INTRAVENOUS
OUTPATIENT
Start: 2023-05-28

## 2022-11-30 RX ORDER — ALBUTEROL SULFATE 90 UG/1
4 AEROSOL, METERED RESPIRATORY (INHALATION) PRN
OUTPATIENT
Start: 2023-05-28

## 2022-11-30 RX ORDER — EPINEPHRINE 1 MG/ML
0.3 INJECTION, SOLUTION, CONCENTRATE INTRAVENOUS PRN
OUTPATIENT
Start: 2023-05-28

## 2022-11-30 RX ORDER — ACETAMINOPHEN 325 MG/1
650 TABLET ORAL
OUTPATIENT
Start: 2023-05-28

## 2022-11-30 RX ORDER — SODIUM CHLORIDE 9 MG/ML
INJECTION, SOLUTION INTRAVENOUS CONTINUOUS
OUTPATIENT
Start: 2023-05-28

## 2022-11-30 RX ADMIN — DENOSUMAB 60 MG: 60 INJECTION SUBCUTANEOUS at 16:32

## 2022-11-30 NOTE — DISCHARGE INSTRUCTIONS
denosumab (Prolia)  Pronunciation:  juan bowden mab  Brand:  Prolia  What is the most important information I should know about Prolia? This medication guide provides information about the Prolia brand of denosumab. Lorelee Pouch is another brand of denosumab used to prevent bone fractures and other skeletal conditions in people with tumors that have spread to the bone. Prolia can cause many serious side effects. Call your doctor at once if you have a fever, chills, pain or burning when you urinate, severe stomach pain, cough, shortness of breath, skin problems, numbness or tingling, severe or unusual pain, or skin problems. Do not use if you are pregnant. Use effective birth control while using Prolia, and for at least 5 months after you stop. What is denosumab (Prolia)? Denosumab is a monoclonal antibody. Monoclonal antibodies are made to target and destroy only certain cells in the body. This may help to protect healthy cells from damage. The Prolia brand of denosumab is used to treat osteoporosis or bone loss in men and women who have a high risk of bone fracture. Prolia is sometimes used in people whose bone fracture is caused by certain medicines or cancer treatments. This medication guide provides information about the Prolia brand of denosumab. Lorelee Pouch is another brand of denosumab used to prevent bone fractures and other skeletal conditions in people with tumors that have spread to the bone. Denosumab may also be used for purposes not listed in this medication guide. What should I discuss with my healthcare provider before receiving Prolia? You should not receive Prolia if you are allergic to denosumab, or if you have:  low levels of calcium in your blood (hypocalcemia); or  if you are pregnant. While you are using Prolia, you should not receive Xgeva, another brand of denosumab.   Tell your doctor if you have ever had:  kidney disease (or if you are on dialysis);  a weak immune system (caused by disease or by using certain medicines);  hypoparathyroidism (decreased functioning of the parathyroid glands);  thyroid surgery;  any condition that makes it hard for your body to absorb nutrients from food (malabsorption);  a latex allergy;  if you are scheduled for a dental procedure; or  if you cannot take daily calcium and vitamin D. Denosumab may cause bone loss (osteonecrosis) in the jaw. Symptoms include jaw pain or numbness, red or swollen gums, loose teeth, gum infection, or slow healing after dental work. The risk of osteonecrosis is highest in people with cancer, blood cell disorders, pre-existing dental problems, or people treated with steroids, chemotherapy, or radiation. Ask your doctor about your own risk. You may need to have a negative pregnancy test before starting this treatment. Do not use Prolia if you are pregnant. It could harm the unborn baby or cause birth defects. Use effective birth control to prevent pregnancy while you are using this medicine and for at least 5 months after your last dose. Tell your doctor right away if you become pregnant. You should not breastfeed while using denosumab. How is Prolia given? Denosumab is injected under the skin of your stomach, upper thigh, or upper arm. A healthcare provider will give you this injection. Prolia is usually given once every 6 months. Your doctor may have you take extra calcium and vitamin D while you are being treated with denosumab. Take only the amount of calcium and vitamin D that your doctor has prescribed. If you need to have any dental work (especially surgery), tell the dentist ahead of time that you are receiving denosumab. Pay special attention to your dental hygiene. Brush and floss your teeth regularly while receiving this medication. You may need to have a dental exam before you begin treatment with Prolia. Follow your doctor's instructions. Your risk of bone fractures can increase when you stop using Prolia.  Do not stop using this medicine without first talking to your doctor. If you keep this medicine at home, store it in the original carton in a refrigerator. Protect from light and do not freeze. Do not shake the prefilled syringe. You may take the carton out of the refrigerator and allow it to reach room temperature before the injection is given. After you have taken Prolia out of the refrigerator, you may keep it at room temperature for up to 14 days. Store in the original container away from heat and light. Each prefilled syringe is for one use only. Throw it away after one use, even if there is still medicine left inside. Use a needle and syringe only once and then place them in a puncture-proof \"sharps\" container. Follow state or local laws about how to dispose of this container. Keep it out of the reach of children and pets. Do not share this medicine with another person, even if they have the same symptoms you have. What happens if I miss a dose? Call your doctor for instructions if you miss a dose or miss an appointment for your Prolia injection. You should receive your missed injection as soon as possible. What happens if I overdose? Seek emergency medical attention or call the Poison Help line at [phone]. What should I avoid while receiving Prolia? Follow your doctor's instructions about any restrictions on food, beverages, or activity. What are the possible side effects of Prolia? Get emergency medical help if you have signs of an allergic reaction: hives, itching, rash; difficult breathing, feeling light-headed; swelling of your face, lips, tongue, or throat.   Call your doctor at once if you have:  new or unusual pain in your thigh, hip, or groin;  severe pain in your joints, muscles, or bones;  skin problems such as dryness, peeling, redness, itching, blisters, bumps, oozing, or crusting; or  low calcium level --muscle spasms or contractions, numbness or tingly feeling (around your mouth, or in your fingers and toes). Serious infections may occur during treatment with Prolia. Call your doctor right away if you have signs of infection such as:  fever, chills, night sweats;  swelling, pain, tenderness, warmth, or redness anywhere on your body;  pain or burning when you urinate;  increased or urgent need to urinate;  severe stomach pain; or  cough, wheezing, feeling short of breath. Common side effects may include:  bladder infection (painful or difficult urination);  lung infection (cough, shortness of breath);  headache;  back pain, muscle pain, joint pain;  increased blood pressure;  cold symptoms such as stuffy nose, sneezing, sore throat;  high cholesterol; or  pain in your arms or legs. This is not a complete list of side effects and others may occur. Call your doctor for medical advice about side effects. You may report side effects to FDA at 9-738-FDA-3183. What other drugs will affect Prolia? Other drugs may affect Prolia, including prescription and over-the-counter medicines, vitamins, and herbal products. Tell your doctor about all your current medicines and any medicine you start or stop using. Where can I get more information? Your doctor or pharmacist can provide more information about denosumab (Prolia). Remember, keep this and all other medicines out of the reach of children, never share your medicines with others, and use this medication only for the indication prescribed. Every effort has been made to ensure that the information provided by [address] is accurate, up-to-date, and complete, but no guarantee is made to that effect. Drug information contained herein may be time sensitive. [facility] information has been compiled for use by healthcare practitioners and consumers in the [facility] and therefore [facility] does not warrant that uses outside of the [facility] are appropriate, unless specifically indicated otherwise.  [facility]'s drug information does not endorse drugs, diagnose patients or recommend therapy. [facility]'s drug information is an informational resource designed to assist licensed healthcare practitioners in caring for their patients and/or to serve consumers viewing this service as a supplement to, and not a substitute for, the expertise, skill, knowledge and judgment of healthcare practitioners. The absence of a warning for a given drug or drug combination in no way should be construed to indicate that the drug or drug combination is safe, effective or appropriate for any given patient. [facility] does not assume any responsibility for any aspect of healthcare administered with the aid of information [facility] provides. The information contained herein is not intended to cover all possible uses, directions, precautions, warnings, drug interactions, allergic reactions, or adverse effects. If you have questions about the drugs you are taking, check with your doctor, nurse or pharmacist.  Copyright 3338-9170 [address]: 12.01. Revision date: 2/18/2020. Care instructions adapted under license by [facility] ([facility]). If you have questions about a medical condition or this instruction, always ask your healthcare professional. [place] [address] any warranty or liability for your use of this information.

## 2023-02-07 RX ORDER — ESCITALOPRAM OXALATE 20 MG/1
TABLET ORAL
Qty: 90 TABLET | Refills: 1 | Status: SHIPPED | OUTPATIENT
Start: 2023-02-07

## 2023-02-07 RX ORDER — NITROFURANTOIN MACROCRYSTALS 50 MG/1
50 CAPSULE ORAL DAILY
Qty: 90 CAPSULE | Refills: 1 | Status: SHIPPED | OUTPATIENT
Start: 2023-02-07

## 2023-05-09 ENCOUNTER — NURSE ONLY (OUTPATIENT)
Dept: PRIMARY CARE CLINIC | Age: 68
End: 2023-05-09

## 2023-05-09 DIAGNOSIS — Z00.00 WELLNESS EXAMINATION: Primary | ICD-10-CM

## 2023-05-09 LAB
CHOLEST SERPL-MCNC: 185 MG/DL (ref 160–199)
HDLC SERPL-MCNC: 68 MG/DL (ref 65–121)
LDLC SERPL CALC-MCNC: 104 MG/DL
T4 FREE SERPL-MCNC: 0.87 NG/DL (ref 0.93–1.7)
TRIGL SERPL-MCNC: 64 MG/DL (ref 0–149)
TSH SERPL DL<=0.005 MIU/L-ACNC: 5.38 UIU/ML (ref 0.27–4.2)

## 2023-05-11 DIAGNOSIS — Z00.00 WELLNESS EXAMINATION: ICD-10-CM

## 2023-05-11 LAB
ALBUMIN SERPL-MCNC: 4.5 G/DL (ref 3.5–5.2)
ALP SERPL-CCNC: 35 U/L (ref 35–104)
ALT SERPL-CCNC: 13 U/L (ref 5–33)
ANION GAP SERPL CALCULATED.3IONS-SCNC: 14 MMOL/L (ref 7–19)
AST SERPL-CCNC: 18 U/L (ref 5–32)
BILIRUB SERPL-MCNC: 0.3 MG/DL (ref 0.2–1.2)
BUN SERPL-MCNC: 14 MG/DL (ref 8–23)
CALCIUM SERPL-MCNC: 9.8 MG/DL (ref 8.8–10.2)
CHLORIDE SERPL-SCNC: 101 MMOL/L (ref 98–111)
CO2 SERPL-SCNC: 25 MMOL/L (ref 22–29)
CREAT SERPL-MCNC: 0.7 MG/DL (ref 0.5–0.9)
GLUCOSE SERPL-MCNC: 67 MG/DL (ref 74–109)
POTASSIUM SERPL-SCNC: 4.5 MMOL/L (ref 3.5–5)
PROT SERPL-MCNC: 7.2 G/DL (ref 6.6–8.7)
SODIUM SERPL-SCNC: 140 MMOL/L (ref 136–145)

## 2023-05-23 ENCOUNTER — NURSE ONLY (OUTPATIENT)
Dept: PRIMARY CARE CLINIC | Age: 68
End: 2023-05-23

## 2023-05-23 DIAGNOSIS — Z00.00 WELLNESS EXAMINATION: ICD-10-CM

## 2023-05-23 DIAGNOSIS — R79.9 ABNORMAL BLOOD CHEMISTRY: Primary | ICD-10-CM

## 2023-05-23 DIAGNOSIS — M81.0 AGE-RELATED OSTEOPOROSIS WITHOUT CURRENT PATHOLOGICAL FRACTURE: ICD-10-CM

## 2023-05-23 LAB
ALBUMIN SERPL-MCNC: 4.6 G/DL (ref 3.5–5.2)
ALP SERPL-CCNC: 43 U/L (ref 35–104)
ALT SERPL-CCNC: 16 U/L (ref 5–33)
ANION GAP SERPL CALCULATED.3IONS-SCNC: 9 MMOL/L (ref 7–19)
AST SERPL-CCNC: 19 U/L (ref 5–32)
BASOPHILS # BLD: 0 K/UL (ref 0–0.2)
BASOPHILS NFR BLD: 0.5 % (ref 0–1)
BILIRUB SERPL-MCNC: <0.2 MG/DL (ref 0.2–1.2)
BUN SERPL-MCNC: 18 MG/DL (ref 8–23)
CALCIUM SERPL-MCNC: 10 MG/DL (ref 8.8–10.2)
CHLORIDE SERPL-SCNC: 102 MMOL/L (ref 98–111)
CO2 SERPL-SCNC: 29 MMOL/L (ref 22–29)
CREAT SERPL-MCNC: 0.7 MG/DL (ref 0.5–0.9)
EOSINOPHIL # BLD: 0.2 K/UL (ref 0–0.6)
EOSINOPHIL NFR BLD: 3.2 % (ref 0–5)
ERYTHROCYTE [DISTWIDTH] IN BLOOD BY AUTOMATED COUNT: 13.4 % (ref 11.5–14.5)
GLUCOSE SERPL-MCNC: 84 MG/DL (ref 74–109)
HCT VFR BLD AUTO: 35.5 % (ref 37–47)
HGB BLD-MCNC: 11.6 G/DL (ref 12–16)
IMM GRANULOCYTES # BLD: 0 K/UL
LYMPHOCYTES # BLD: 1.6 K/UL (ref 1.1–4.5)
LYMPHOCYTES NFR BLD: 26.9 % (ref 20–40)
MCH RBC QN AUTO: 30.1 PG (ref 27–31)
MCHC RBC AUTO-ENTMCNC: 32.7 G/DL (ref 33–37)
MCV RBC AUTO: 92.2 FL (ref 81–99)
MONOCYTES # BLD: 0.4 K/UL (ref 0–0.9)
MONOCYTES NFR BLD: 7.5 % (ref 0–10)
NEUTROPHILS # BLD: 3.6 K/UL (ref 1.5–7.5)
NEUTS SEG NFR BLD: 61.7 % (ref 50–65)
PLATELET # BLD AUTO: 341 K/UL (ref 130–400)
PMV BLD AUTO: 8.8 FL (ref 9.4–12.3)
POTASSIUM SERPL-SCNC: 4.3 MMOL/L (ref 3.5–5)
PROT SERPL-MCNC: 7.1 G/DL (ref 6.6–8.7)
RBC # BLD AUTO: 3.85 M/UL (ref 4.2–5.4)
SODIUM SERPL-SCNC: 140 MMOL/L (ref 136–145)
WBC # BLD AUTO: 5.9 K/UL (ref 4.8–10.8)

## 2023-05-30 ENCOUNTER — HOSPITAL ENCOUNTER (OUTPATIENT)
Dept: INFUSION THERAPY | Age: 68
Setting detail: INFUSION SERIES
Discharge: HOME | End: 2023-05-30
Payer: MEDICARE

## 2023-05-30 DIAGNOSIS — M81.0 AGE-RELATED OSTEOPOROSIS WITHOUT CURRENT PATHOLOGICAL FRACTURE: Primary | ICD-10-CM

## 2023-05-30 PROCEDURE — 6360000002 HC RX W HCPCS: Performed by: FAMILY MEDICINE

## 2023-05-30 PROCEDURE — 96372 THER/PROPH/DIAG INJ SC/IM: CPT

## 2023-05-30 RX ORDER — SODIUM CHLORIDE 9 MG/ML
INJECTION, SOLUTION INTRAVENOUS CONTINUOUS
OUTPATIENT
Start: 2023-11-21

## 2023-05-30 RX ORDER — DIPHENHYDRAMINE HYDROCHLORIDE 50 MG/ML
50 INJECTION INTRAMUSCULAR; INTRAVENOUS
OUTPATIENT
Start: 2023-11-21

## 2023-05-30 RX ORDER — ACETAMINOPHEN 325 MG/1
650 TABLET ORAL
OUTPATIENT
Start: 2023-11-21

## 2023-05-30 RX ORDER — ALBUTEROL SULFATE 90 UG/1
4 AEROSOL, METERED RESPIRATORY (INHALATION) PRN
OUTPATIENT
Start: 2023-11-21

## 2023-05-30 RX ORDER — EPINEPHRINE 1 MG/ML
0.3 INJECTION, SOLUTION, CONCENTRATE INTRAVENOUS PRN
OUTPATIENT
Start: 2023-11-21

## 2023-05-30 RX ORDER — ONDANSETRON 2 MG/ML
8 INJECTION INTRAMUSCULAR; INTRAVENOUS
OUTPATIENT
Start: 2023-11-21

## 2023-05-30 RX ADMIN — DENOSUMAB 60 MG: 60 INJECTION SUBCUTANEOUS at 15:04

## 2023-05-30 NOTE — DISCHARGE INSTRUCTIONS
denosumab (Prolia)  Pronunciation:  juan bowden mab  Brand:  Prolia  What is the most important information I should know about Prolia? This medication guide provides information about the Prolia brand of denosumab. [place] is another brand of denosumab used to prevent bone fractures and other skeletal conditions in people with tumors that have spread to the bone. Prolia can cause many serious side effects. Call your doctor at once if you have a fever, chills, pain or burning when you urinate, severe stomach pain, cough, shortness of breath, skin problems, numbness or tingling, severe or unusual pain, or skin problems. Do not use if you are pregnant. Use effective birth control while using Prolia, and for at least 5 months after you stop. What is denosumab (Prolia)? Denosumab is a monoclonal antibody. Monoclonal antibodies are made to target and destroy only certain cells in the body. This may help to protect healthy cells from damage. The Prolia brand of denosumab is used to treat osteoporosis or bone loss in men and women who have a high risk of bone fracture. Prolia is sometimes used in people whose bone fracture is caused by certain medicines or cancer treatments. This medication guide provides information about the Prolia brand of denosumab. [place] is another brand of denosumab used to prevent bone fractures and other skeletal conditions in people with tumors that have spread to the bone. Denosumab may also be used for purposes not listed in this medication guide. What should I discuss with my healthcare provider before receiving Prolia? You should not receive Prolia if you are allergic to denosumab, or if you have:  low levels of calcium in your blood (hypocalcemia); or  if you are pregnant. While you are using Prolia, you should not receive Xgeva, another brand of denosumab.   Tell your doctor if you have ever had:  kidney disease (or if you are on dialysis);  a weak immune system (caused by

## 2023-05-30 NOTE — PROGRESS NOTES
Pt received prolia 60mg. Pt tolerated well.      Electronically signed by Na Meyers RN on 5/30/2023 at 3:10 PM

## 2023-06-06 ENCOUNTER — OFFICE VISIT (OUTPATIENT)
Dept: PRIMARY CARE CLINIC | Age: 68
End: 2023-06-06

## 2023-06-06 VITALS
DIASTOLIC BLOOD PRESSURE: 70 MMHG | SYSTOLIC BLOOD PRESSURE: 110 MMHG | OXYGEN SATURATION: 97 % | WEIGHT: 134.6 LBS | BODY MASS INDEX: 22.42 KG/M2 | HEIGHT: 65 IN | RESPIRATION RATE: 16 BRPM | TEMPERATURE: 96.3 F | HEART RATE: 65 BPM

## 2023-06-06 DIAGNOSIS — Z00.00 MEDICARE ANNUAL WELLNESS VISIT, SUBSEQUENT: Primary | ICD-10-CM

## 2023-06-06 DIAGNOSIS — R79.89 ABNORMAL THYROID BLOOD TEST: ICD-10-CM

## 2023-06-06 DIAGNOSIS — E03.9 HYPOTHYROIDISM, UNSPECIFIED TYPE: ICD-10-CM

## 2023-06-06 RX ORDER — LEVOTHYROXINE SODIUM 0.03 MG/1
25 TABLET ORAL DAILY
Qty: 90 TABLET | Refills: 3 | Status: SHIPPED | OUTPATIENT
Start: 2023-06-06

## 2023-06-06 RX ORDER — VIT C/B6/B5/MAGNESIUM/HERB 173 50-5-6-5MG
CAPSULE ORAL DAILY
COMMUNITY

## 2023-06-06 RX ORDER — NITROFURANTOIN MACROCRYSTALS 50 MG/1
50 CAPSULE ORAL DAILY
Qty: 30 CAPSULE | Refills: 0 | Status: SHIPPED | OUTPATIENT
Start: 2023-06-06

## 2023-06-06 ASSESSMENT — PATIENT HEALTH QUESTIONNAIRE - PHQ9
SUM OF ALL RESPONSES TO PHQ9 QUESTIONS 1 & 2: 0
SUM OF ALL RESPONSES TO PHQ QUESTIONS 1-9: 0
1. LITTLE INTEREST OR PLEASURE IN DOING THINGS: 0
SUM OF ALL RESPONSES TO PHQ QUESTIONS 1-9: 0
2. FEELING DOWN, DEPRESSED OR HOPELESS: 0
SUM OF ALL RESPONSES TO PHQ QUESTIONS 1-9: 0
SUM OF ALL RESPONSES TO PHQ QUESTIONS 1-9: 0

## 2023-06-07 NOTE — PATIENT INSTRUCTIONS
Pain, pressure, or a strange feeling in the back, neck, jaw, or upper belly or in one or both shoulders or arms.     Lightheadedness or sudden weakness.     A fast or irregular heartbeat. After you call 911, the  may tell you to chew 1 adult-strength or 2 to 4 low-dose aspirin. Wait for an ambulance. Do not try to drive yourself. Watch closely for changes in your health, and be sure to contact your doctor if you have any problems. Where can you learn more? Go to http://www.jones.com/ and enter F075 to learn more about \"A Healthy Heart: Care Instructions. \"  Current as of: September 7, 2022               Content Version: 13.6  © 1006-5931 [employer], [employer]. Care instructions adapted under license by [facility] ([facility]). If you have questions about a medical condition or this instruction, always ask your healthcare professional. [place] [address] any warranty or liability for your use of this information. Personalized Preventive Plan for Marissa May - 6/6/2023  Medicare offers a range of preventive health benefits. Some of the tests and screenings are paid in full while other may be subject to a deductible, co-insurance, and/or copay. Some of these benefits include a comprehensive review of your medical history including lifestyle, illnesses that may run in your family, and various assessments and screenings as appropriate. After reviewing your medical record and screening and assessments performed today your provider may have ordered immunizations, labs, imaging, and/or referrals for you. A list of these orders (if applicable) as well as your Preventive Care list are included within your After Visit Summary for your review. Other Preventive Recommendations:    A preventive eye exam performed by an eye specialist is recommended every 1-2 years to screen for glaucoma; cataracts, macular degeneration, and other eye disorders.   A preventive dental

## 2023-06-07 NOTE — PROGRESS NOTES
Cholecalciferol (VITAMIN D3) 50 MCG (2000 UT) CAPS Take by mouth Yes Historical Provider, MD   Calcium Carb-Cholecalciferol (CALCIUM 1000 + D PO) Take by mouth Yes Historical Provider, MD   Biotin 10 MG tablet Take 1 tablet by mouth daily Yes Historical Provider, MD   COLLAGEN PO Take by mouth Yes Historical Provider, MD   magnesium 200 MG TABS tablet Take 1 tablet by mouth 2 times daily Yes Historical Provider, MD   DHEA 25 MG CAPS Take 1 capsule by mouth daily Yes Historical Provider, MD   Glucosamine Sulfate 1000 MG CAPS Take 1 capsule by mouth 2 times daily Yes Historical Provider, MD   Emollient (RA MOISTURIZING CREAM EX) Apply 1 Dose topically daily Yes Historical Provider, MD   Multiple Vitamins-Minerals (CENTRUM SILVER 50+WOMEN PO) Take 1 tablet by mouth daily Yes Historical Provider, MD   aspirin 81 MG EC tablet Take 1 tablet by mouth as needed (M, W and F) Yes Historical Provider, MD   Hyaluronic Acid-Vitamin C (HYALURONIC ACID PO) Take 1 tablet by mouth daily Yes Historical Provider, MD       CareTeam (Including outside providers/suppliers regularly involved in providing care):   Patient Care Team:  Kenneth Barry MD as PCP - General (Family Medicine)  Kenneth Barry MD as PCP - Empaneled Provider     Reviewed and updated this visit:  Tobacco  Allergies  Meds  Med Hx  Surg Hx  Soc Hx  Fam Hx

## 2023-07-07 ENCOUNTER — NURSE ONLY (OUTPATIENT)
Dept: PRIMARY CARE CLINIC | Age: 68
End: 2023-07-07

## 2023-07-07 DIAGNOSIS — M81.0 AGE-RELATED OSTEOPOROSIS WITHOUT CURRENT PATHOLOGICAL FRACTURE: ICD-10-CM

## 2023-07-07 DIAGNOSIS — E03.9 HYPOTHYROIDISM, UNSPECIFIED TYPE: ICD-10-CM

## 2023-07-07 LAB
ALBUMIN SERPL-MCNC: 4.5 G/DL (ref 3.5–5.2)
ALP SERPL-CCNC: 40 U/L (ref 35–104)
ALT SERPL-CCNC: 15 U/L (ref 5–33)
ANION GAP SERPL CALCULATED.3IONS-SCNC: 12 MMOL/L (ref 7–19)
AST SERPL-CCNC: 18 U/L (ref 5–32)
BILIRUB SERPL-MCNC: <0.2 MG/DL (ref 0.2–1.2)
BUN SERPL-MCNC: 23 MG/DL (ref 8–23)
CALCIUM SERPL-MCNC: 10 MG/DL (ref 8.8–10.2)
CHLORIDE SERPL-SCNC: 100 MMOL/L (ref 98–111)
CO2 SERPL-SCNC: 28 MMOL/L (ref 22–29)
CREAT SERPL-MCNC: 0.7 MG/DL (ref 0.5–0.9)
GLUCOSE SERPL-MCNC: 80 MG/DL (ref 74–109)
POTASSIUM SERPL-SCNC: 4.1 MMOL/L (ref 3.5–5)
PROT SERPL-MCNC: 7.3 G/DL (ref 6.6–8.7)
SODIUM SERPL-SCNC: 140 MMOL/L (ref 136–145)
T4 FREE SERPL-MCNC: 0.92 NG/DL (ref 0.93–1.7)
TSH SERPL DL<=0.005 MIU/L-ACNC: 2.41 UIU/ML (ref 0.27–4.2)

## 2023-09-06 ENCOUNTER — TELEPHONE (OUTPATIENT)
Dept: PRIMARY CARE CLINIC | Age: 68
End: 2023-09-06

## 2023-10-18 RX ORDER — ESCITALOPRAM OXALATE 20 MG/1
TABLET ORAL
Qty: 90 TABLET | Refills: 1 | Status: SHIPPED | OUTPATIENT
Start: 2023-10-18 | End: 2023-10-19 | Stop reason: ALTCHOICE

## 2023-10-19 ENCOUNTER — OFFICE VISIT (OUTPATIENT)
Dept: PRIMARY CARE CLINIC | Age: 68
End: 2023-10-19
Payer: MEDICARE

## 2023-10-19 VITALS
BODY MASS INDEX: 25.52 KG/M2 | OXYGEN SATURATION: 98 % | DIASTOLIC BLOOD PRESSURE: 64 MMHG | SYSTOLIC BLOOD PRESSURE: 108 MMHG | RESPIRATION RATE: 16 BRPM | HEART RATE: 71 BPM | WEIGHT: 130 LBS | TEMPERATURE: 97.9 F | HEIGHT: 60 IN

## 2023-10-19 DIAGNOSIS — F41.9 ANXIETY: ICD-10-CM

## 2023-10-19 DIAGNOSIS — R10.10 UPPER ABDOMINAL PAIN: Primary | ICD-10-CM

## 2023-10-19 DIAGNOSIS — L20.82 FLEXURAL ECZEMA: ICD-10-CM

## 2023-10-19 DIAGNOSIS — R11.2 NAUSEA AND VOMITING, UNSPECIFIED VOMITING TYPE: ICD-10-CM

## 2023-10-19 LAB
ALBUMIN SERPL-MCNC: 4.4 G/DL (ref 3.5–5.2)
ALP SERPL-CCNC: 48 U/L (ref 35–104)
ALT SERPL-CCNC: 15 U/L (ref 5–33)
ANION GAP SERPL CALCULATED.3IONS-SCNC: 9 MMOL/L (ref 7–19)
AST SERPL-CCNC: 17 U/L (ref 5–32)
BASOPHILS # BLD: 0 K/UL (ref 0–0.2)
BASOPHILS NFR BLD: 0.2 % (ref 0–1)
BILIRUB SERPL-MCNC: 0.5 MG/DL (ref 0.2–1.2)
BUN SERPL-MCNC: 14 MG/DL (ref 8–23)
CALCIUM SERPL-MCNC: 9.3 MG/DL (ref 8.8–10.2)
CHLORIDE SERPL-SCNC: 99 MMOL/L (ref 98–111)
CO2 SERPL-SCNC: 29 MMOL/L (ref 22–29)
CREAT SERPL-MCNC: 0.6 MG/DL (ref 0.5–0.9)
EOSINOPHIL # BLD: 0 K/UL (ref 0–0.6)
EOSINOPHIL NFR BLD: 0.5 % (ref 0–5)
ERYTHROCYTE [DISTWIDTH] IN BLOOD BY AUTOMATED COUNT: 13.5 % (ref 11.5–14.5)
GLUCOSE SERPL-MCNC: 94 MG/DL (ref 74–109)
HCT VFR BLD AUTO: 37.5 % (ref 37–47)
HGB BLD-MCNC: 11.8 G/DL (ref 12–16)
IMM GRANULOCYTES # BLD: 0 K/UL
LIPASE SERPL-CCNC: 41 U/L (ref 13–60)
LYMPHOCYTES # BLD: 1.3 K/UL (ref 1.1–4.5)
LYMPHOCYTES NFR BLD: 14.6 % (ref 20–40)
MCH RBC QN AUTO: 29.4 PG (ref 27–31)
MCHC RBC AUTO-ENTMCNC: 31.5 G/DL (ref 33–37)
MCV RBC AUTO: 93.3 FL (ref 81–99)
MONOCYTES # BLD: 0.6 K/UL (ref 0–0.9)
MONOCYTES NFR BLD: 6.7 % (ref 0–10)
NEUTROPHILS # BLD: 6.9 K/UL (ref 1.5–7.5)
NEUTS SEG NFR BLD: 77.8 % (ref 50–65)
PLATELET # BLD AUTO: 331 K/UL (ref 130–400)
PMV BLD AUTO: 8.8 FL (ref 9.4–12.3)
POTASSIUM SERPL-SCNC: 3.5 MMOL/L (ref 3.5–5)
PROT SERPL-MCNC: 7.4 G/DL (ref 6.6–8.7)
RBC # BLD AUTO: 4.02 M/UL (ref 4.2–5.4)
SODIUM SERPL-SCNC: 137 MMOL/L (ref 136–145)
WBC # BLD AUTO: 8.9 K/UL (ref 4.8–10.8)

## 2023-10-19 PROCEDURE — 1036F TOBACCO NON-USER: CPT | Performed by: FAMILY MEDICINE

## 2023-10-19 PROCEDURE — 3017F COLORECTAL CA SCREEN DOC REV: CPT | Performed by: FAMILY MEDICINE

## 2023-10-19 PROCEDURE — G8484 FLU IMMUNIZE NO ADMIN: HCPCS | Performed by: FAMILY MEDICINE

## 2023-10-19 PROCEDURE — G8399 PT W/DXA RESULTS DOCUMENT: HCPCS | Performed by: FAMILY MEDICINE

## 2023-10-19 PROCEDURE — 99214 OFFICE O/P EST MOD 30 MIN: CPT | Performed by: FAMILY MEDICINE

## 2023-10-19 PROCEDURE — 1123F ACP DISCUSS/DSCN MKR DOCD: CPT | Performed by: FAMILY MEDICINE

## 2023-10-19 PROCEDURE — 1090F PRES/ABSN URINE INCON ASSESS: CPT | Performed by: FAMILY MEDICINE

## 2023-10-19 PROCEDURE — G8427 DOCREV CUR MEDS BY ELIG CLIN: HCPCS | Performed by: FAMILY MEDICINE

## 2023-10-19 PROCEDURE — G8419 CALC BMI OUT NRM PARAM NOF/U: HCPCS | Performed by: FAMILY MEDICINE

## 2023-10-19 RX ORDER — ESCITALOPRAM OXALATE 5 MG/1
5 TABLET ORAL DAILY
Qty: 30 TABLET | Refills: 0 | Status: SHIPPED | OUTPATIENT
Start: 2023-10-19

## 2023-10-19 RX ORDER — BUPROPION HYDROCHLORIDE 150 MG/1
150 TABLET ORAL EVERY MORNING
Qty: 30 TABLET | Refills: 3 | Status: SHIPPED | OUTPATIENT
Start: 2023-10-19

## 2023-10-19 RX ORDER — CLOBETASOL PROPIONATE 0.5 MG/G
OINTMENT TOPICAL
Qty: 30 G | Refills: 0 | Status: SHIPPED | OUTPATIENT
Start: 2023-10-19

## 2023-10-19 SDOH — ECONOMIC STABILITY: FOOD INSECURITY: WITHIN THE PAST 12 MONTHS, YOU WORRIED THAT YOUR FOOD WOULD RUN OUT BEFORE YOU GOT MONEY TO BUY MORE.: NEVER TRUE

## 2023-10-19 SDOH — ECONOMIC STABILITY: FOOD INSECURITY: WITHIN THE PAST 12 MONTHS, THE FOOD YOU BOUGHT JUST DIDN'T LAST AND YOU DIDN'T HAVE MONEY TO GET MORE.: NEVER TRUE

## 2023-10-19 SDOH — ECONOMIC STABILITY: HOUSING INSECURITY
IN THE LAST 12 MONTHS, WAS THERE A TIME WHEN YOU DID NOT HAVE A STEADY PLACE TO SLEEP OR SLEPT IN A SHELTER (INCLUDING NOW)?: NO

## 2023-10-19 SDOH — ECONOMIC STABILITY: INCOME INSECURITY: HOW HARD IS IT FOR YOU TO PAY FOR THE VERY BASICS LIKE FOOD, HOUSING, MEDICAL CARE, AND HEATING?: NOT VERY HARD

## 2023-10-19 ASSESSMENT — ENCOUNTER SYMPTOMS
BACK PAIN: 0
WHEEZING: 0
NAUSEA: 1
VOMITING: 1
DIARRHEA: 1
ABDOMINAL PAIN: 1
COUGH: 0
COLOR CHANGE: 0
EYE DISCHARGE: 0

## 2023-10-19 ASSESSMENT — PATIENT HEALTH QUESTIONNAIRE - PHQ9
SUM OF ALL RESPONSES TO PHQ QUESTIONS 1-9: 2
1. LITTLE INTEREST OR PLEASURE IN DOING THINGS: 1
SUM OF ALL RESPONSES TO PHQ QUESTIONS 1-9: 2
2. FEELING DOWN, DEPRESSED OR HOPELESS: 1
SUM OF ALL RESPONSES TO PHQ9 QUESTIONS 1 & 2: 2
SUM OF ALL RESPONSES TO PHQ QUESTIONS 1-9: 2
SUM OF ALL RESPONSES TO PHQ QUESTIONS 1-9: 2

## 2023-10-19 NOTE — PROGRESS NOTES
tablet by mouth daily 90 tablet 3    Cholecalciferol (VITAMIN D3) 50 MCG (2000 UT) CAPS Take by mouth      Calcium Carb-Cholecalciferol (CALCIUM 1000 + D PO) Take by mouth      Biotin 10 MG tablet Take 1 tablet by mouth daily      COLLAGEN PO Take by mouth      magnesium 200 MG TABS tablet Take 1 tablet by mouth 2 times daily      DHEA 25 MG CAPS Take 1 capsule by mouth daily      Glucosamine Sulfate 1000 MG CAPS Take 1 capsule by mouth 2 times daily      Emollient (RA MOISTURIZING CREAM EX) Apply 1 Dose topically daily (Patient not taking: Reported on 10/19/2023)      Multiple Vitamins-Minerals (CENTRUM SILVER 50+WOMEN PO) Take 1 tablet by mouth daily      aspirin 81 MG EC tablet Take 1 tablet by mouth as needed (M, W and F)      Hyaluronic Acid-Vitamin C (HYALURONIC ACID PO) Take 1 tablet by mouth daily       No current facility-administered medications on file prior to visit. No Known Allergies    Review of Systems   Constitutional:  Negative for activity change, appetite change and fever. HENT:  Negative for congestion and nosebleeds. Eyes:  Negative for discharge. Respiratory:  Negative for cough and wheezing. Cardiovascular:  Negative for chest pain and leg swelling. Gastrointestinal:  Positive for abdominal pain, diarrhea, nausea and vomiting. Genitourinary:  Negative for difficulty urinating, frequency and urgency. Musculoskeletal:  Negative for back pain and gait problem. Skin:  Negative for color change and rash. Neurological:  Negative for dizziness and headaches. Hematological:  Does not bruise/bleed easily. Psychiatric/Behavioral:  Negative for sleep disturbance and suicidal ideas. OBJECTIVE:    Physical Exam  Vitals reviewed. Constitutional:       General: She is not in acute distress. Appearance: Normal appearance. She is well-developed. She is not diaphoretic. HENT:      Head: Normocephalic and atraumatic.       Right Ear: External ear normal.      Left Ear:

## 2023-10-20 ENCOUNTER — TELEPHONE (OUTPATIENT)
Dept: PRIMARY CARE CLINIC | Age: 68
End: 2023-10-20

## 2023-10-20 RX ORDER — TRIAMCINOLONE ACETONIDE 1 MG/G
OINTMENT TOPICAL 2 TIMES DAILY
Qty: 30 G | Refills: 1 | Status: SHIPPED | OUTPATIENT
Start: 2023-10-20 | End: 2023-10-27

## 2023-10-20 NOTE — TELEPHONE ENCOUNTER
Pt states clobetasol topical for eczema is too expensive, she is requesting to have something different sent to [facility],[address].

## 2023-10-25 ENCOUNTER — HOSPITAL ENCOUNTER (OUTPATIENT)
Dept: ULTRASOUND IMAGING | Age: 68
Discharge: HOME | End: 2023-10-25
Payer: MEDICARE

## 2023-10-25 DIAGNOSIS — R10.10 UPPER ABDOMINAL PAIN: ICD-10-CM

## 2023-10-25 PROCEDURE — 76705 ECHO EXAM OF ABDOMEN: CPT

## 2023-11-06 ENCOUNTER — TELEPHONE (OUTPATIENT)
Dept: PRIMARY CARE CLINIC | Age: 68
End: 2023-11-06

## 2023-11-06 RX ORDER — DENOSUMAB 60 MG/ML
60 INJECTION SUBCUTANEOUS ONCE
Qty: 1 ML | Refills: 2 | Status: SHIPPED | OUTPATIENT
Start: 2023-11-06 | End: 2023-11-06

## 2023-11-06 NOTE — TELEPHONE ENCOUNTER
Can you find out if she sees dermatology or has a dermatologist that she sees routinely? If she does not need to get a referral put in for 1 for the eczema.

## 2023-11-06 NOTE — TELEPHONE ENCOUNTER
I am not sure how to get it set up at [facility]. I am assuming they have an infusion center just like [address] do. I can print off the order or prescription and he can fax it to them. I am not sure if they will take that or if we need to do something different but if they tell us we need to do something different let me know.

## 2023-11-06 NOTE — TELEPHONE ENCOUNTER
Pt states she now wants her Prolia shot at [facility] and she wants us to send the order and she will schedule. Also states the ointment for Eczema is not working.

## 2023-11-13 ENCOUNTER — TELEPHONE (OUTPATIENT)
Dept: PRIMARY CARE CLINIC | Age: 68
End: 2023-11-13

## 2023-11-13 DIAGNOSIS — M81.0 AGE-RELATED OSTEOPOROSIS WITHOUT CURRENT PATHOLOGICAL FRACTURE: Primary | ICD-10-CM

## 2023-11-13 RX ORDER — BUPROPION HYDROCHLORIDE 150 MG/1
150 TABLET ORAL EVERY MORNING
Qty: 90 TABLET | Refills: 1 | Status: SHIPPED | OUTPATIENT
Start: 2023-11-13

## 2023-11-13 RX ORDER — DIPHENHYDRAMINE HYDROCHLORIDE 50 MG/ML
50 INJECTION INTRAMUSCULAR; INTRAVENOUS
OUTPATIENT
Start: 2023-11-30

## 2023-11-13 RX ORDER — ESCITALOPRAM OXALATE 5 MG/1
5 TABLET ORAL DAILY
Qty: 90 TABLET | Refills: 1 | Status: SHIPPED | OUTPATIENT
Start: 2023-11-13

## 2023-11-13 RX ORDER — ALBUTEROL SULFATE 90 UG/1
4 AEROSOL, METERED RESPIRATORY (INHALATION) PRN
OUTPATIENT
Start: 2023-11-30

## 2023-11-13 RX ORDER — EPINEPHRINE 1 MG/ML
0.3 INJECTION, SOLUTION, CONCENTRATE INTRAVENOUS PRN
OUTPATIENT
Start: 2023-11-30

## 2023-11-13 RX ORDER — SODIUM CHLORIDE 9 MG/ML
INJECTION, SOLUTION INTRAVENOUS CONTINUOUS
OUTPATIENT
Start: 2023-11-30

## 2023-11-13 RX ORDER — ONDANSETRON 2 MG/ML
8 INJECTION INTRAMUSCULAR; INTRAVENOUS
OUTPATIENT
Start: 2023-11-30

## 2023-11-13 RX ORDER — ACETAMINOPHEN 325 MG/1
650 TABLET ORAL
OUTPATIENT
Start: 2023-11-30

## 2023-11-13 NOTE — TELEPHONE ENCOUNTER
Pt states she needs an order to get her Calcium checked. Fax order to [facility] ([facility]).

## 2023-11-14 ENCOUNTER — TELEPHONE (OUTPATIENT)
Dept: PRIMARY CARE CLINIC | Age: 68
End: 2023-11-14

## 2023-11-15 DIAGNOSIS — M81.0 AGE-RELATED OSTEOPOROSIS WITHOUT CURRENT PATHOLOGICAL FRACTURE: ICD-10-CM

## 2023-11-15 NOTE — TELEPHONE ENCOUNTER
I am assuming that was just a refill that came through from the pharmacy. Yes she was supposed to be weaning down off of the Lexapro. If she has already come off of it she does not have to get the Lexapro filled that was sent to the pharmacy. I see her back on the 29th for follow-up to see how she is doing with the Wellbutrin.

## 2023-11-29 ENCOUNTER — OFFICE VISIT (OUTPATIENT)
Dept: PRIMARY CARE CLINIC | Age: 68
End: 2023-11-29
Payer: MEDICARE

## 2023-11-29 VITALS
WEIGHT: 126 LBS | OXYGEN SATURATION: 97 % | DIASTOLIC BLOOD PRESSURE: 70 MMHG | RESPIRATION RATE: 18 BRPM | HEART RATE: 80 BPM | HEIGHT: 65 IN | BODY MASS INDEX: 20.99 KG/M2 | TEMPERATURE: 97.1 F | SYSTOLIC BLOOD PRESSURE: 100 MMHG

## 2023-11-29 DIAGNOSIS — F41.9 ANXIETY: Primary | ICD-10-CM

## 2023-11-29 PROCEDURE — 3017F COLORECTAL CA SCREEN DOC REV: CPT | Performed by: FAMILY MEDICINE

## 2023-11-29 PROCEDURE — G8420 CALC BMI NORM PARAMETERS: HCPCS | Performed by: FAMILY MEDICINE

## 2023-11-29 PROCEDURE — 1090F PRES/ABSN URINE INCON ASSESS: CPT | Performed by: FAMILY MEDICINE

## 2023-11-29 PROCEDURE — 1036F TOBACCO NON-USER: CPT | Performed by: FAMILY MEDICINE

## 2023-11-29 PROCEDURE — G8484 FLU IMMUNIZE NO ADMIN: HCPCS | Performed by: FAMILY MEDICINE

## 2023-11-29 PROCEDURE — G8427 DOCREV CUR MEDS BY ELIG CLIN: HCPCS | Performed by: FAMILY MEDICINE

## 2023-11-29 PROCEDURE — 99213 OFFICE O/P EST LOW 20 MIN: CPT | Performed by: FAMILY MEDICINE

## 2023-11-29 PROCEDURE — G8399 PT W/DXA RESULTS DOCUMENT: HCPCS | Performed by: FAMILY MEDICINE

## 2023-11-29 PROCEDURE — 1123F ACP DISCUSS/DSCN MKR DOCD: CPT | Performed by: FAMILY MEDICINE

## 2023-11-29 RX ORDER — BUSPIRONE HYDROCHLORIDE 5 MG/1
5 TABLET ORAL 3 TIMES DAILY PRN
Qty: 90 TABLET | Refills: 0 | Status: SHIPPED | OUTPATIENT
Start: 2023-11-29 | End: 2023-12-29

## 2023-11-29 ASSESSMENT — ENCOUNTER SYMPTOMS
COUGH: 0
NAUSEA: 0
DIARRHEA: 0
COLOR CHANGE: 0
ABDOMINAL PAIN: 0
BACK PAIN: 0
WHEEZING: 0
EYE DISCHARGE: 0
VOMITING: 0

## 2023-11-29 NOTE — PROGRESS NOTES
SUBJECTIVE:    Patient ID: Pauline Mosher is a 76 y.o. female. HPI:   Patient is seen today for a follow-up visit after recently starting Wellbutrin and coming off of Lexapro. She feels like her anxiety has improved some but she does feel like she is little more irritable. She states that she does like that the Wellbutrin is controlling her appetite and she is not wanting to eat all of the time and stay hungry. She does feel like she has more energy. She has lost 4 pounds since her visit at the beginning of October. She states that she is tolerating the Wellbutrin well otherwise. She states that she does tend to get stressed around the holidays. Past Medical History:   Diagnosis Date    Age-related osteoporosis without current pathological fracture 11/18/2022    Arthralgia     MANOLO (generalized anxiety disorder)     MANOLO (generalized anxiety disorder)     Hyperlipidemia     Osteopenia       Current Outpatient Medications on File Prior to Visit   Medication Sig Dispense Refill    buPROPion (WELLBUTRIN XL) 150 MG extended release tablet Take 1 tablet by mouth every morning 90 tablet 1    denosumab (PROLIA) 60 MG/ML SOSY SC injection Inject 1 mL into the skin once for 1 dose To inject every 6 months. 1 mL 2    clobetasol (TEMOVATE) 0.05 % ointment Apply topically 2 times daily.  30 g 0    turmeric 500 MG CAPS Take by mouth daily      Multiple Vitamins-Minerals (PRESERVISION AREDS 2+MULTI VIT PO) Take by mouth      nitrofurantoin (MACRODANTIN) 50 MG capsule Take 1 capsule by mouth daily 30 capsule 0    levothyroxine (SYNTHROID) 25 MCG tablet Take 1 tablet by mouth daily 90 tablet 3    Cholecalciferol (VITAMIN D3) 50 MCG (2000 UT) CAPS Take by mouth      Calcium Carb-Cholecalciferol (CALCIUM 1000 + D PO) Take by mouth      Biotin 10 MG tablet Take 1 tablet by mouth daily      COLLAGEN PO Take by mouth      magnesium 200 MG TABS tablet Take 1 tablet by mouth 2 times daily      Glucosamine Sulfate 1000 MG CAPS

## 2023-11-30 ENCOUNTER — HOSPITAL ENCOUNTER (OUTPATIENT)
Dept: INFUSION THERAPY | Age: 68
Setting detail: INFUSION SERIES
Discharge: HOME | End: 2023-11-30
Payer: MEDICARE

## 2023-11-30 VITALS
RESPIRATION RATE: 18 BRPM | SYSTOLIC BLOOD PRESSURE: 97 MMHG | DIASTOLIC BLOOD PRESSURE: 65 MMHG | OXYGEN SATURATION: 96 % | TEMPERATURE: 96.9 F | HEART RATE: 78 BPM

## 2023-11-30 DIAGNOSIS — M81.0 AGE-RELATED OSTEOPOROSIS WITHOUT CURRENT PATHOLOGICAL FRACTURE: Primary | ICD-10-CM

## 2023-11-30 PROCEDURE — 96372 THER/PROPH/DIAG INJ SC/IM: CPT

## 2023-11-30 PROCEDURE — 6360000002 HC RX W HCPCS: Performed by: FAMILY MEDICINE

## 2023-11-30 RX ORDER — DIPHENHYDRAMINE HYDROCHLORIDE 50 MG/ML
50 INJECTION INTRAMUSCULAR; INTRAVENOUS
OUTPATIENT
Start: 2024-05-30

## 2023-11-30 RX ORDER — ONDANSETRON 2 MG/ML
8 INJECTION INTRAMUSCULAR; INTRAVENOUS
OUTPATIENT
Start: 2024-05-30

## 2023-11-30 RX ORDER — EPINEPHRINE 1 MG/ML
0.3 INJECTION, SOLUTION, CONCENTRATE INTRAVENOUS PRN
OUTPATIENT
Start: 2024-05-30

## 2023-11-30 RX ORDER — ALBUTEROL SULFATE 90 UG/1
4 AEROSOL, METERED RESPIRATORY (INHALATION) PRN
OUTPATIENT
Start: 2024-05-30

## 2023-11-30 RX ORDER — ACETAMINOPHEN 325 MG/1
650 TABLET ORAL
OUTPATIENT
Start: 2024-05-30

## 2023-11-30 RX ORDER — SODIUM CHLORIDE 9 MG/ML
INJECTION, SOLUTION INTRAVENOUS CONTINUOUS
OUTPATIENT
Start: 2024-05-30

## 2023-11-30 RX ADMIN — DENOSUMAB 60 MG: 60 INJECTION SUBCUTANEOUS at 16:02

## 2023-11-30 NOTE — DISCHARGE INSTRUCTIONS
denosumab (Prolia)  Pronunciation:  juan bowden mab  Brand:  Prolia  What is the most important information I should know about Prolia? This medication guide provides information about the Prolia brand of denosumab. Cyntha Mile is another brand of denosumab used to prevent bone fractures and other skeletal conditions in people with tumors that have spread to the bone. Prolia can cause many serious side effects. Call your doctor at once if you have a fever, chills, pain or burning when you urinate, severe stomach pain, cough, shortness of breath, skin problems, numbness or tingling, severe or unusual pain, or skin problems. Do not use if you are pregnant. Use effective birth control while using Prolia, and for at least 5 months after you stop. What is denosumab (Prolia)? Denosumab is a monoclonal antibody. Monoclonal antibodies are made to target and destroy only certain cells in the body. This may help to protect healthy cells from damage. The Prolia brand of denosumab is used to treat osteoporosis or bone loss in men and women who have a high risk of bone fracture. Prolia is sometimes used in people whose bone fracture is caused by certain medicines or cancer treatments. This medication guide provides information about the Prolia brand of denosumab. Cyntha Mile is another brand of denosumab used to prevent bone fractures and other skeletal conditions in people with tumors that have spread to the bone. Denosumab may also be used for purposes not listed in this medication guide. What should I discuss with my healthcare provider before receiving Prolia? You should not receive Prolia if you are allergic to denosumab, or if you have:  low levels of calcium in your blood (hypocalcemia); or  if you are pregnant. While you are using Prolia, you should not receive Xgeva, another brand of denosumab.   Tell your doctor if you have ever had:  kidney disease (or if you are on dialysis);  a weak immune system (caused by disease or by using certain medicines);  hypoparathyroidism (decreased functioning of the parathyroid glands);  thyroid surgery;  any condition that makes it hard for your body to absorb nutrients from food (malabsorption);  a latex allergy;  if you are scheduled for a dental procedure; or  if you cannot take daily calcium and vitamin D. Denosumab may cause bone loss (osteonecrosis) in the jaw. Symptoms include jaw pain or numbness, red or swollen gums, loose teeth, gum infection, or slow healing after dental work. The risk of osteonecrosis is highest in people with cancer, blood cell disorders, pre-existing dental problems, or people treated with steroids, chemotherapy, or radiation. Ask your doctor about your own risk. You may need to have a negative pregnancy test before starting this treatment. Do not use Prolia if you are pregnant. It could harm the unborn baby or cause birth defects. Use effective birth control to prevent pregnancy while you are using this medicine and for at least 5 months after your last dose. Tell your doctor right away if you become pregnant. You should not breastfeed while using denosumab. How is Prolia given? Denosumab is injected under the skin of your stomach, upper thigh, or upper arm. A healthcare provider will give you this injection. Prolia is usually given once every 6 months. Your doctor may have you take extra calcium and vitamin D while you are being treated with denosumab. Take only the amount of calcium and vitamin D that your doctor has prescribed. If you need to have any dental work (especially surgery), tell the dentist ahead of time that you are receiving denosumab. Pay special attention to your dental hygiene. Brush and floss your teeth regularly while receiving this medication. You may need to have a dental exam before you begin treatment with Prolia. Follow your doctor's instructions. Your risk of bone fractures can increase when you stop using Prolia.  Do

## 2024-02-23 RX ORDER — BUPROPION HYDROCHLORIDE 150 MG/1
150 TABLET ORAL EVERY MORNING
Qty: 90 TABLET | Refills: 1 | Status: SHIPPED | OUTPATIENT
Start: 2024-02-23

## 2024-05-16 ENCOUNTER — TELEPHONE (OUTPATIENT)
Dept: PRIMARY CARE CLINIC | Age: 69
End: 2024-05-16

## 2024-05-16 DIAGNOSIS — M81.0 AGE-RELATED OSTEOPOROSIS WITHOUT CURRENT PATHOLOGICAL FRACTURE: Primary | ICD-10-CM

## 2024-05-16 NOTE — TELEPHONE ENCOUNTER
Pt states she is scheduled for Prolia shot 05/30/24. She states she will need an order to check her Calcium before then and fax to [facility]. Also she states she has been prescribed Prednisone but will not take until after Prolia shot. She states there is an interaction between Prednisone and Wellbutrin? Can cause Seizures?

## 2024-05-16 NOTE — TELEPHONE ENCOUNTER
She can totally take wellbutrin and prednisone together.  We use these all the time.  I will order calcium.

## 2024-05-20 RX ORDER — LEVOTHYROXINE SODIUM 0.03 MG/1
25 TABLET ORAL DAILY
Qty: 90 TABLET | Refills: 3 | OUTPATIENT
Start: 2024-05-20

## 2024-05-20 RX ORDER — LEVOTHYROXINE SODIUM 0.03 MG/1
25 TABLET ORAL DAILY
Qty: 90 TABLET | Refills: 3 | Status: SHIPPED | OUTPATIENT
Start: 2024-05-20

## 2024-05-21 DIAGNOSIS — M81.0 AGE-RELATED OSTEOPOROSIS WITHOUT CURRENT PATHOLOGICAL FRACTURE: ICD-10-CM

## 2024-06-04 DIAGNOSIS — E03.9 HYPOTHYROIDISM, UNSPECIFIED TYPE: Primary | ICD-10-CM

## 2024-06-04 DIAGNOSIS — E78.5 HYPERLIPIDEMIA, UNSPECIFIED HYPERLIPIDEMIA TYPE: ICD-10-CM

## 2024-06-04 DIAGNOSIS — Z78.0 POST-MENOPAUSAL: ICD-10-CM

## 2024-06-04 DIAGNOSIS — E03.9 HYPOTHYROIDISM, UNSPECIFIED TYPE: ICD-10-CM

## 2024-06-04 DIAGNOSIS — M81.0 OSTEOPOROSIS WITHOUT CURRENT PATHOLOGICAL FRACTURE, UNSPECIFIED OSTEOPOROSIS TYPE: ICD-10-CM

## 2024-06-04 LAB
25(OH)D3 SERPL-MCNC: 78 NG/ML
ALBUMIN SERPL-MCNC: 4.3 G/DL (ref 3.5–5.2)
ALP SERPL-CCNC: 38 U/L (ref 35–104)
ALT SERPL-CCNC: 18 U/L (ref 5–33)
ANION GAP SERPL CALCULATED.3IONS-SCNC: 12 MMOL/L (ref 7–19)
AST SERPL-CCNC: 15 U/L (ref 5–32)
BASOPHILS # BLD: 0 K/UL (ref 0–0.2)
BASOPHILS NFR BLD: 0.8 % (ref 0–1)
BILIRUB SERPL-MCNC: 0.3 MG/DL (ref 0.2–1.2)
BUN SERPL-MCNC: 21 MG/DL (ref 8–23)
CALCIUM SERPL-MCNC: 9.5 MG/DL (ref 8.8–10.2)
CHLORIDE SERPL-SCNC: 100 MMOL/L (ref 98–111)
CHOLEST SERPL-MCNC: 205 MG/DL (ref 160–199)
CO2 SERPL-SCNC: 29 MMOL/L (ref 22–29)
CREAT SERPL-MCNC: 0.7 MG/DL (ref 0.5–0.9)
EOSINOPHIL # BLD: 0.1 K/UL (ref 0–0.6)
EOSINOPHIL NFR BLD: 2 % (ref 0–5)
ERYTHROCYTE [DISTWIDTH] IN BLOOD BY AUTOMATED COUNT: 13.6 % (ref 11.5–14.5)
GLUCOSE SERPL-MCNC: 85 MG/DL (ref 74–109)
HCT VFR BLD AUTO: 37.2 % (ref 37–47)
HDLC SERPL-MCNC: 92 MG/DL (ref 65–121)
HGB BLD-MCNC: 11.9 G/DL (ref 12–16)
IMM GRANULOCYTES # BLD: 0 K/UL
LDLC SERPL CALC-MCNC: 96 MG/DL
LYMPHOCYTES # BLD: 1.8 K/UL (ref 1.1–4.5)
LYMPHOCYTES NFR BLD: 36.2 % (ref 20–40)
MCH RBC QN AUTO: 30.3 PG (ref 27–31)
MCHC RBC AUTO-ENTMCNC: 32 G/DL (ref 33–37)
MCV RBC AUTO: 94.7 FL (ref 81–99)
MONOCYTES # BLD: 0.5 K/UL (ref 0–0.9)
MONOCYTES NFR BLD: 10.7 % (ref 0–10)
NEUTROPHILS # BLD: 2.5 K/UL (ref 1.5–7.5)
NEUTS SEG NFR BLD: 49.9 % (ref 50–65)
PLATELET # BLD AUTO: 327 K/UL (ref 130–400)
PMV BLD AUTO: 9.1 FL (ref 9.4–12.3)
POTASSIUM SERPL-SCNC: 3.8 MMOL/L (ref 3.5–5)
PROT SERPL-MCNC: 6.8 G/DL (ref 6.6–8.7)
RBC # BLD AUTO: 3.93 M/UL (ref 4.2–5.4)
SODIUM SERPL-SCNC: 141 MMOL/L (ref 136–145)
T4 FREE SERPL-MCNC: 1.04 NG/DL (ref 0.93–1.7)
TRIGL SERPL-MCNC: 85 MG/DL (ref 0–149)
TSH SERPL DL<=0.005 MIU/L-ACNC: 6.09 UIU/ML (ref 0.27–4.2)
WBC # BLD AUTO: 5 K/UL (ref 4.8–10.8)

## 2024-06-09 SDOH — HEALTH STABILITY: PHYSICAL HEALTH: ON AVERAGE, HOW MANY MINUTES DO YOU ENGAGE IN EXERCISE AT THIS LEVEL?: 0 MIN

## 2024-06-09 SDOH — ECONOMIC STABILITY: FOOD INSECURITY: WITHIN THE PAST 12 MONTHS, YOU WORRIED THAT YOUR FOOD WOULD RUN OUT BEFORE YOU GOT MONEY TO BUY MORE.: NEVER TRUE

## 2024-06-09 SDOH — ECONOMIC STABILITY: INCOME INSECURITY: HOW HARD IS IT FOR YOU TO PAY FOR THE VERY BASICS LIKE FOOD, HOUSING, MEDICAL CARE, AND HEATING?: NOT HARD AT ALL

## 2024-06-09 SDOH — ECONOMIC STABILITY: FOOD INSECURITY: WITHIN THE PAST 12 MONTHS, THE FOOD YOU BOUGHT JUST DIDN'T LAST AND YOU DIDN'T HAVE MONEY TO GET MORE.: NEVER TRUE

## 2024-06-09 SDOH — ECONOMIC STABILITY: TRANSPORTATION INSECURITY
IN THE PAST 12 MONTHS, HAS LACK OF TRANSPORTATION KEPT YOU FROM MEETINGS, WORK, OR FROM GETTING THINGS NEEDED FOR DAILY LIVING?: NO

## 2024-06-09 SDOH — HEALTH STABILITY: PHYSICAL HEALTH: ON AVERAGE, HOW MANY DAYS PER WEEK DO YOU ENGAGE IN MODERATE TO STRENUOUS EXERCISE (LIKE A BRISK WALK)?: 0 DAYS

## 2024-06-09 ASSESSMENT — LIFESTYLE VARIABLES
HOW OFTEN DO YOU HAVE SIX OR MORE DRINKS ON ONE OCCASION: 1
HAS A RELATIVE, FRIEND, DOCTOR, OR ANOTHER HEALTH PROFESSIONAL EXPRESSED CONCERN ABOUT YOUR DRINKING OR SUGGESTED YOU CUT DOWN: NO
HOW OFTEN DO YOU HAVE A DRINK CONTAINING ALCOHOL: 5
HOW OFTEN DURING THE LAST YEAR HAVE YOU FOUND THAT YOU WERE NOT ABLE TO STOP DRINKING ONCE YOU HAD STARTED: NEVER
HOW OFTEN DURING THE LAST YEAR HAVE YOU NEEDED AN ALCOHOLIC DRINK FIRST THING IN THE MORNING TO GET YOURSELF GOING AFTER A NIGHT OF HEAVY DRINKING: NEVER
HOW OFTEN DURING THE LAST YEAR HAVE YOU NEEDED AN ALCOHOLIC DRINK FIRST THING IN THE MORNING TO GET YOURSELF GOING AFTER A NIGHT OF HEAVY DRINKING: NEVER
HOW MANY STANDARD DRINKS CONTAINING ALCOHOL DO YOU HAVE ON A TYPICAL DAY: 1 OR 2
HOW OFTEN DURING THE LAST YEAR HAVE YOU BEEN UNABLE TO REMEMBER WHAT HAPPENED THE NIGHT BEFORE BECAUSE YOU HAD BEEN DRINKING: NEVER
HOW OFTEN DURING THE LAST YEAR HAVE YOU FAILED TO DO WHAT WAS NORMALLY EXPECTED FROM YOU BECAUSE OF DRINKING: NEVER
HAVE YOU OR SOMEONE ELSE BEEN INJURED AS A RESULT OF YOUR DRINKING: NO
HOW OFTEN DURING THE LAST YEAR HAVE YOU FOUND THAT YOU WERE NOT ABLE TO STOP DRINKING ONCE YOU HAD STARTED: NEVER
HAVE YOU OR SOMEONE ELSE BEEN INJURED AS A RESULT OF YOUR DRINKING: NO
HOW OFTEN DURING THE LAST YEAR HAVE YOU HAD A FEELING OF GUILT OR REMORSE AFTER DRINKING: NEVER
HOW OFTEN DO YOU HAVE A DRINK CONTAINING ALCOHOL: 4 OR MORE TIMES A WEEK
HAS A RELATIVE, FRIEND, DOCTOR, OR ANOTHER HEALTH PROFESSIONAL EXPRESSED CONCERN ABOUT YOUR DRINKING OR SUGGESTED YOU CUT DOWN: NO
HOW OFTEN DURING THE LAST YEAR HAVE YOU FAILED TO DO WHAT WAS NORMALLY EXPECTED FROM YOU BECAUSE OF DRINKING: NEVER
HOW OFTEN DURING THE LAST YEAR HAVE YOU BEEN UNABLE TO REMEMBER WHAT HAPPENED THE NIGHT BEFORE BECAUSE YOU HAD BEEN DRINKING: NEVER
HOW MANY STANDARD DRINKS CONTAINING ALCOHOL DO YOU HAVE ON A TYPICAL DAY: 1
HOW OFTEN DURING THE LAST YEAR HAVE YOU HAD A FEELING OF GUILT OR REMORSE AFTER DRINKING: NEVER

## 2024-06-09 ASSESSMENT — PATIENT HEALTH QUESTIONNAIRE - PHQ9
SUM OF ALL RESPONSES TO PHQ QUESTIONS 1-9: 2
2. FEELING DOWN, DEPRESSED OR HOPELESS: SEVERAL DAYS
SUM OF ALL RESPONSES TO PHQ9 QUESTIONS 1 & 2: 2
SUM OF ALL RESPONSES TO PHQ QUESTIONS 1-9: 2
1. LITTLE INTEREST OR PLEASURE IN DOING THINGS: SEVERAL DAYS
SUM OF ALL RESPONSES TO PHQ QUESTIONS 1-9: 2
SUM OF ALL RESPONSES TO PHQ QUESTIONS 1-9: 2

## 2024-06-10 ENCOUNTER — OFFICE VISIT (OUTPATIENT)
Dept: PRIMARY CARE CLINIC | Age: 69
End: 2024-06-10
Payer: MEDICARE

## 2024-06-10 VITALS
WEIGHT: 117.2 LBS | HEART RATE: 76 BPM | TEMPERATURE: 98.2 F | SYSTOLIC BLOOD PRESSURE: 110 MMHG | BODY MASS INDEX: 19.53 KG/M2 | DIASTOLIC BLOOD PRESSURE: 78 MMHG | HEIGHT: 65 IN | RESPIRATION RATE: 16 BRPM | OXYGEN SATURATION: 98 %

## 2024-06-10 DIAGNOSIS — F41.9 ANXIETY: ICD-10-CM

## 2024-06-10 DIAGNOSIS — Z12.31 SCREENING MAMMOGRAM FOR BREAST CANCER: ICD-10-CM

## 2024-06-10 DIAGNOSIS — Z00.00 MEDICARE ANNUAL WELLNESS VISIT, SUBSEQUENT: Primary | ICD-10-CM

## 2024-06-10 DIAGNOSIS — M81.0 AGE-RELATED OSTEOPOROSIS WITHOUT CURRENT PATHOLOGICAL FRACTURE: ICD-10-CM

## 2024-06-10 DIAGNOSIS — Z78.0 POST-MENOPAUSAL: ICD-10-CM

## 2024-06-10 PROCEDURE — G0439 PPPS, SUBSEQ VISIT: HCPCS | Performed by: FAMILY MEDICINE

## 2024-06-10 PROCEDURE — G8420 CALC BMI NORM PARAMETERS: HCPCS | Performed by: FAMILY MEDICINE

## 2024-06-10 PROCEDURE — G8427 DOCREV CUR MEDS BY ELIG CLIN: HCPCS | Performed by: FAMILY MEDICINE

## 2024-06-10 PROCEDURE — 1090F PRES/ABSN URINE INCON ASSESS: CPT | Performed by: FAMILY MEDICINE

## 2024-06-10 PROCEDURE — 99213 OFFICE O/P EST LOW 20 MIN: CPT | Performed by: FAMILY MEDICINE

## 2024-06-10 PROCEDURE — G8399 PT W/DXA RESULTS DOCUMENT: HCPCS | Performed by: FAMILY MEDICINE

## 2024-06-10 PROCEDURE — 1036F TOBACCO NON-USER: CPT | Performed by: FAMILY MEDICINE

## 2024-06-10 PROCEDURE — 3017F COLORECTAL CA SCREEN DOC REV: CPT | Performed by: FAMILY MEDICINE

## 2024-06-10 PROCEDURE — 1123F ACP DISCUSS/DSCN MKR DOCD: CPT | Performed by: FAMILY MEDICINE

## 2024-06-10 NOTE — PROGRESS NOTES
Medicare Annual Wellness Visit    Janet Smith is here for Medicare AWV (Pt already had her labs) and Stress (Wants to discuss her stress, doesn't feel like the wellbutrin is helping and she knows the steroids ae not helping things)    Assessment & Plan   Medicare annual wellness visit, subsequent  Age-related osteoporosis without current pathological fracture  -     DEXA BONE DENSITY AXIAL SKELETON; Future  Screening mammogram for breast cancer  -     ALEXIS DIGITAL SCREEN W OR WO CAD BILATERAL; Future  Post-menopausal  -     DEXA BONE DENSITY AXIAL SKELETON; Future  Anxiety  Bone density was ordered as well as mammogram.  Will notify her of the results of those we receive them back.  We are going to have her restart the BuSpar to see if this helps with the anxiety.  Continue Wellbutrin.  Discussed that a lot of the situational anxiety could be from the prednisone.  If this is not improving she is to let us know.  Recommendations for Preventive Services Due: see orders and patient instructions/AVS.  Recommended screening schedule for the next 5-10 years is provided to the patient in written form: see Patient Instructions/AVS.     No follow-ups on file.     Subjective   The following acute and/or chronic problems were also addressed today:  Patient is seen today for Medicare annual wellness but states that she is struggling some with her anxiety.  She is currently on Wellbutrin .  She is tolerating this well.  She states that she has been on prednisone recently and she feels like that is increased her anxiety.  She does have BuSpar at home but states that she has not been taking it.  She states that she would not be opposed to trying this again.  She states that she feels like she tolerated the BuSpar well.      Patient's complete Health Risk Assessment and screening values have been reviewed and are found in Flowsheets. The following problems were reviewed today and where indicated follow up appointments

## 2024-06-10 NOTE — PATIENT INSTRUCTIONS

## 2024-06-12 ENCOUNTER — TRANSCRIBE ORDERS (OUTPATIENT)
Dept: ADMINISTRATIVE | Facility: HOSPITAL | Age: 69
End: 2024-06-12
Payer: MEDICARE

## 2024-06-12 DIAGNOSIS — Z12.31 ENCOUNTER FOR SCREENING MAMMOGRAM FOR MALIGNANT NEOPLASM OF BREAST: Primary | ICD-10-CM

## 2024-06-16 ENCOUNTER — TRANSCRIBE ORDERS (OUTPATIENT)
Dept: ADMINISTRATIVE | Facility: HOSPITAL | Age: 69
End: 2024-06-16
Payer: MEDICARE

## 2024-06-16 DIAGNOSIS — Z78.0 POSTMENOPAUSAL: Primary | ICD-10-CM

## 2024-06-16 DIAGNOSIS — M81.0 AGE-RELATED OSTEOPOROSIS WITHOUT CURRENT PATHOLOGICAL FRACTURE: ICD-10-CM

## 2024-07-01 ENCOUNTER — HOSPITAL ENCOUNTER (OUTPATIENT)
Dept: INFUSION THERAPY | Age: 69
Setting detail: INFUSION SERIES
Discharge: HOME | End: 2024-07-01
Payer: MEDICARE

## 2024-07-01 VITALS
OXYGEN SATURATION: 99 % | SYSTOLIC BLOOD PRESSURE: 92 MMHG | DIASTOLIC BLOOD PRESSURE: 58 MMHG | TEMPERATURE: 97.6 F | HEART RATE: 75 BPM | RESPIRATION RATE: 18 BRPM

## 2024-07-01 DIAGNOSIS — M81.0 AGE-RELATED OSTEOPOROSIS WITHOUT CURRENT PATHOLOGICAL FRACTURE: Primary | ICD-10-CM

## 2024-07-01 PROCEDURE — 96372 THER/PROPH/DIAG INJ SC/IM: CPT

## 2024-07-01 PROCEDURE — 6360000002 HC RX W HCPCS: Performed by: FAMILY MEDICINE

## 2024-07-01 RX ORDER — ONDANSETRON 2 MG/ML
8 INJECTION INTRAMUSCULAR; INTRAVENOUS
OUTPATIENT
Start: 2024-07-01

## 2024-07-01 RX ORDER — DIPHENHYDRAMINE HYDROCHLORIDE 50 MG/ML
50 INJECTION INTRAMUSCULAR; INTRAVENOUS
OUTPATIENT
Start: 2024-07-01

## 2024-07-01 RX ORDER — ALBUTEROL SULFATE 90 UG/1
4 AEROSOL, METERED RESPIRATORY (INHALATION) PRN
OUTPATIENT
Start: 2024-07-01

## 2024-07-01 RX ORDER — SODIUM CHLORIDE 9 MG/ML
INJECTION, SOLUTION INTRAVENOUS CONTINUOUS
OUTPATIENT
Start: 2024-07-01

## 2024-07-01 RX ORDER — ACETAMINOPHEN 325 MG/1
650 TABLET ORAL
OUTPATIENT
Start: 2024-07-01

## 2024-07-01 RX ORDER — EPINEPHRINE 1 MG/ML
0.3 INJECTION, SOLUTION, CONCENTRATE INTRAVENOUS PRN
OUTPATIENT
Start: 2024-07-01

## 2024-07-01 RX ADMIN — DENOSUMAB 60 MG: 60 INJECTION SUBCUTANEOUS at 15:43

## 2024-07-01 NOTE — DISCHARGE INSTRUCTIONS
Denosumab Prefilled Syringe (DENOSUMAB - INJECTION)  For bone strength.  Brand Name(s): Prolia  Generic Name: Denosumab  Instructions  This medicine is given as an injection.  This medicine should be given by a trained health care provider.  It is important that you keep taking each dose of this medicine on time even if you are feeling well.  If you miss a dose, contact your doctor for instructions.  Drug interactions can change how medicines work or increase risk for side effects. Tell your health care providers about all medicines taken. Include prescription and over-the-counter medicines, vitamins, and herbal medicines. Speak with your doctor or pharmacist before starting or stopping any medicine.  You may need vitamin and mineral supplements while on this medicine. Please speak with your doctor or pharmacist.  Visit your dentist regularly. Proper care of your teeth is very important while taking this medicine. Brush your teeth and floss regularly.  Keep all appointments for medical exams and tests while on this medicine.  Cautions  Tell your doctor and pharmacist if you ever had an allergic reaction to a medicine.  This medicine is associated with a rare but very serious medical condition. Please speak with your doctor about symptoms you should look out for while on this medicine. Notify your doctor immediately if you develop those symptoms.  Some patients on this medicine have developed severe, life-threatening infections. Please speak with your doctor about the risks and benefits of using this medicine.  Call the doctor if there are any signs of confusion or unusual changes in behavior.  It is unknown if this medicine passes into breast milk. Ask your doctor before breastfeeding.  This medicine can hurt a new baby in the womb. If you become pregnant while on this medicine, tell your doctor immediately. Your doctor may switch you to a different medicine.  Women of childbearing age should have a negative  have any questions, please ask your pharmacist.  The display and use of this drug information is subject to Terms of Use.  More information about DENOSUMAB - INJECTION      Copyright(c) 2024 [employer].  Selected from data included with permission and copyright by [employer]. This copyrighted material has been downloaded from a licensed data provider and is not for distribution, except as may be authorized by the applicable terms of use.  Conditions of Use: The information in this database is intended to supplement, not substitute for the expertise and judgment of healthcare professionals. The information is not intended to cover all possible uses, directions, precautions, drug interactions or adverse effects nor should it be construed to indicate that use of a particular drug is safe, appropriate or effective for you or anyone else. A healthcare professional should be consulted before taking any drug, changing any diet or commencing or discontinuing any course of treatment. The display and use of this drug information is subject to express Terms of Use.  Care instructions adapted under license by [employer]. If you have questions about a medical condition or this instruction, always ask your healthcare professional. [employer] disclaims any warranty or liability for your use of this information.

## 2024-07-08 ENCOUNTER — NURSE ONLY (OUTPATIENT)
Dept: PRIMARY CARE CLINIC | Age: 69
End: 2024-07-08

## 2024-07-08 DIAGNOSIS — E03.9 HYPOTHYROIDISM, UNSPECIFIED TYPE: Primary | ICD-10-CM

## 2024-07-08 LAB
T4 FREE SERPL-MCNC: 0.94 NG/DL (ref 0.93–1.7)
TSH SERPL DL<=0.005 MIU/L-ACNC: 2.5 UIU/ML (ref 0.27–4.2)

## 2024-08-08 ENCOUNTER — OFFICE VISIT (OUTPATIENT)
Dept: PRIMARY CARE CLINIC | Age: 69
End: 2024-08-08

## 2024-08-08 VITALS
WEIGHT: 121 LBS | SYSTOLIC BLOOD PRESSURE: 122 MMHG | RESPIRATION RATE: 16 BRPM | HEART RATE: 75 BPM | OXYGEN SATURATION: 98 % | DIASTOLIC BLOOD PRESSURE: 78 MMHG | BODY MASS INDEX: 20.16 KG/M2 | HEIGHT: 65 IN | TEMPERATURE: 97.4 F

## 2024-08-08 DIAGNOSIS — J02.9 SORE THROAT: ICD-10-CM

## 2024-08-08 DIAGNOSIS — J06.9 URI, ACUTE: Primary | ICD-10-CM

## 2024-08-08 RX ORDER — TRIAMCINOLONE ACETONIDE 1 MG/G
CREAM TOPICAL
COMMUNITY
Start: 2024-07-08

## 2024-08-08 ASSESSMENT — ENCOUNTER SYMPTOMS
RHINORRHEA: 1
VOMITING: 0
DIARRHEA: 0
EYE DISCHARGE: 0
BACK PAIN: 0
SINUS PAIN: 1
NAUSEA: 0
WHEEZING: 0
SORE THROAT: 1
COLOR CHANGE: 0
ABDOMINAL PAIN: 0
COUGH: 0
SINUS PRESSURE: 1

## 2024-08-08 NOTE — PROGRESS NOTES
SUBJECTIVE:    Patient ID: Janet Smith is a 69 y.o. female.    HPI:   Patient is seen today for complaints of a sore throat and headache.  She is also had some sinus pressure.  She states that she has not had any known sick exposure.  Her sister is getting ready to have a surgery and she is going to be taking care of her so she wants to make sure that she is well before that.  She denies any vomiting or diarrhea.  She denies any fever.  She denies any rash.      Past Medical History:   Diagnosis Date    Age-related osteoporosis without current pathological fracture 11/18/2022    Arthralgia     MANOLO (generalized anxiety disorder)     MANOLO (generalized anxiety disorder)     Hyperlipidemia     Osteopenia       Current Outpatient Medications on File Prior to Visit   Medication Sig Dispense Refill    triamcinolone (KENALOG) 0.1 % cream APPLY TO AREAS OF RASH TWICE A DAY X 3 WEEKS, THEN STOP      levothyroxine (SYNTHROID) 25 MCG tablet TAKE 1 TABLET BY MOUTH EVERY DAY 90 tablet 3    buPROPion (WELLBUTRIN XL) 150 MG extended release tablet TAKE 1 TABLET BY MOUTH EVERY DAY IN THE MORNING 90 tablet 1    denosumab (PROLIA) 60 MG/ML SOSY SC injection Inject 1 mL into the skin once for 1 dose To inject every 6 months. 1 mL 2    Multiple Vitamins-Minerals (PRESERVISION AREDS 2+MULTI VIT PO) Take by mouth      nitrofurantoin (MACRODANTIN) 50 MG capsule Take 1 capsule by mouth daily 30 capsule 0    COLLAGEN PO Take by mouth      Emollient (RA MOISTURIZING CREAM EX) Apply 1 Dose topically daily      Multiple Vitamins-Minerals (CENTRUM SILVER 50+WOMEN PO) Take 1 tablet by mouth daily      aspirin 81 MG EC tablet Take 1 tablet by mouth as needed (M, W and F)       No current facility-administered medications on file prior to visit.     No Known Allergies    Review of Systems   Constitutional:  Positive for fatigue. Negative for activity change, appetite change and fever.   HENT:  Positive for postnasal drip, rhinorrhea, sinus

## 2024-09-27 ENCOUNTER — OFFICE VISIT (OUTPATIENT)
Dept: PRIMARY CARE CLINIC | Age: 69
End: 2024-09-27

## 2024-09-27 VITALS
BODY MASS INDEX: 19.86 KG/M2 | DIASTOLIC BLOOD PRESSURE: 68 MMHG | RESPIRATION RATE: 18 BRPM | OXYGEN SATURATION: 98 % | TEMPERATURE: 97.9 F | HEART RATE: 82 BPM | SYSTOLIC BLOOD PRESSURE: 90 MMHG | WEIGHT: 119.2 LBS | HEIGHT: 65 IN

## 2024-09-27 DIAGNOSIS — R31.9 HEMATURIA, UNSPECIFIED TYPE: ICD-10-CM

## 2024-09-27 DIAGNOSIS — R52 BODY ACHES: Primary | ICD-10-CM
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
## 2024-09-27 LAB
APPEARANCE FLUID: CLEAR
BILIRUBIN, POC: NORMAL
BLOOD URINE, POC: NORMAL
CLARITY, POC: CLEAR
COLOR, POC: YELLOW
GLUCOSE URINE, POC: NORMAL MG/DL
KETONES, POC: NORMAL MG/DL
LEUKOCYTE EST, POC: NORMAL
NITRITE, POC: NORMAL
PH, POC: 8
PROTEIN, POC: NORMAL MG/DL
SPECIFIC GRAVITY, POC: 1.02
UROBILINOGEN, POC: 0.2 MG/DL

## 2024-09-27 RX ORDER — DOXYCYCLINE HYCLATE 100 MG
100 TABLET ORAL 2 TIMES DAILY
Qty: 14 TABLET | Refills: 0 | Status: SHIPPED | OUTPATIENT
Start: 2024-09-27 | End: 2024-10-04

## 2024-09-27 ASSESSMENT — ENCOUNTER SYMPTOMS
COUGH: 0
DIARRHEA: 0
WHEEZING: 0
ABDOMINAL DISTENTION: 0
NAUSEA: 0
ALLERGIC/IMMUNOLOGIC NEGATIVE: 1
ABDOMINAL PAIN: 0
GASTROINTESTINAL NEGATIVE: 1
VOMITING: 0
SORE THROAT: 1
RESPIRATORY NEGATIVE: 1
CONSTIPATION: 0
EYES NEGATIVE: 1
RHINORRHEA: 0
SHORTNESS OF BREATH: 0

## 2024-09-29 LAB — BACTERIA UR CULT: NORMAL

## 2024-11-18 ENCOUNTER — TELEPHONE (OUTPATIENT)
Dept: PRIMARY CARE CLINIC | Age: 69
End: 2024-11-18

## 2024-11-18 NOTE — TELEPHONE ENCOUNTER
They should already have this on file because it was given in July.  She will not be due until after January 1 because it is every 6 months

## 2024-11-20 DIAGNOSIS — M81.0 AGE-RELATED OSTEOPOROSIS WITHOUT CURRENT PATHOLOGICAL FRACTURE: Primary | ICD-10-CM

## 2024-11-20 RX ORDER — HYDROCORTISONE SODIUM SUCCINATE 100 MG/2ML
100 INJECTION INTRAMUSCULAR; INTRAVENOUS
Status: CANCELLED | OUTPATIENT
Start: 2024-11-20

## 2024-11-20 RX ORDER — ONDANSETRON 2 MG/ML
8 INJECTION INTRAMUSCULAR; INTRAVENOUS
OUTPATIENT
Start: 2024-11-20

## 2024-11-20 RX ORDER — DIPHENHYDRAMINE HYDROCHLORIDE 50 MG/ML
50 INJECTION INTRAMUSCULAR; INTRAVENOUS
OUTPATIENT
Start: 2024-11-20

## 2024-11-20 RX ORDER — ACETAMINOPHEN 325 MG/1
650 TABLET ORAL
OUTPATIENT
Start: 2024-11-20

## 2024-11-20 RX ORDER — SODIUM CHLORIDE 9 MG/ML
INJECTION, SOLUTION INTRAVENOUS CONTINUOUS
OUTPATIENT
Start: 2024-11-20

## 2024-11-20 RX ORDER — HYDROCORTISONE SODIUM SUCCINATE 100 MG/2ML
100 INJECTION INTRAMUSCULAR; INTRAVENOUS
OUTPATIENT
Start: 2024-11-20

## 2024-11-20 RX ORDER — ONDANSETRON 2 MG/ML
8 INJECTION INTRAMUSCULAR; INTRAVENOUS
Status: CANCELLED | OUTPATIENT
Start: 2024-11-20

## 2024-11-20 RX ORDER — ACETAMINOPHEN 325 MG/1
650 TABLET ORAL
Status: CANCELLED | OUTPATIENT
Start: 2024-11-20

## 2024-11-20 RX ORDER — EPINEPHRINE 1 MG/ML
0.3 INJECTION, SOLUTION, CONCENTRATE INTRAVENOUS PRN
Status: CANCELLED | OUTPATIENT
Start: 2024-11-20

## 2024-11-20 RX ORDER — EPINEPHRINE 1 MG/ML
0.3 INJECTION, SOLUTION, CONCENTRATE INTRAVENOUS PRN
OUTPATIENT
Start: 2024-11-20

## 2024-11-20 RX ORDER — SODIUM CHLORIDE 9 MG/ML
INJECTION, SOLUTION INTRAVENOUS CONTINUOUS
Status: CANCELLED | OUTPATIENT
Start: 2024-11-20

## 2024-11-20 RX ORDER — DIPHENHYDRAMINE HYDROCHLORIDE 50 MG/ML
50 INJECTION INTRAMUSCULAR; INTRAVENOUS
Status: CANCELLED | OUTPATIENT
Start: 2024-11-20

## 2024-11-20 RX ORDER — ALBUTEROL SULFATE 90 UG/1
4 INHALANT RESPIRATORY (INHALATION) PRN
OUTPATIENT
Start: 2024-11-20

## 2024-11-20 RX ORDER — ALBUTEROL SULFATE 90 UG/1
4 INHALANT RESPIRATORY (INHALATION) PRN
Status: CANCELLED | OUTPATIENT
Start: 2024-11-20

## 2024-12-02 ENCOUNTER — HOSPITAL ENCOUNTER (OUTPATIENT)
Dept: MAMMOGRAPHY | Facility: HOSPITAL | Age: 69
Discharge: HOME | End: 2024-12-02
Payer: MEDICARE

## 2024-12-02 ENCOUNTER — HOSPITAL ENCOUNTER (OUTPATIENT)
Dept: BONE DENSITY | Facility: HOSPITAL | Age: 69
Discharge: HOME | End: 2024-12-02
Payer: MEDICARE

## 2024-12-02 DIAGNOSIS — M81.0 AGE-RELATED OSTEOPOROSIS WITHOUT CURRENT PATHOLOGICAL FRACTURE: ICD-10-CM

## 2024-12-02 DIAGNOSIS — Z12.31 ENCOUNTER FOR SCREENING MAMMOGRAM FOR MALIGNANT NEOPLASM OF BREAST: ICD-10-CM

## 2024-12-02 DIAGNOSIS — Z78.0 POSTMENOPAUSAL: ICD-10-CM

## 2024-12-02 PROCEDURE — 77080 DXA BONE DENSITY AXIAL: CPT

## 2024-12-02 PROCEDURE — 77063 BREAST TOMOSYNTHESIS BI: CPT

## 2024-12-02 PROCEDURE — 77067 SCR MAMMO BI INCL CAD: CPT

## 2024-12-03 DIAGNOSIS — M81.0 AGE-RELATED OSTEOPOROSIS WITHOUT CURRENT PATHOLOGICAL FRACTURE: ICD-10-CM

## 2024-12-03 DIAGNOSIS — Z78.0 POST-MENOPAUSAL: ICD-10-CM

## 2024-12-03 DIAGNOSIS — Z12.31 SCREENING MAMMOGRAM FOR BREAST CANCER: ICD-10-CM

## 2024-12-03 DIAGNOSIS — R92.8 ABNORMAL MAMMOGRAM OF LEFT BREAST: Primary | ICD-10-CM

## 2024-12-05 ENCOUNTER — HOSPITAL ENCOUNTER (OUTPATIENT)
Dept: MAMMOGRAPHY | Facility: HOSPITAL | Age: 69
Discharge: HOME | End: 2024-12-05
Payer: MEDICARE

## 2024-12-05 ENCOUNTER — HOSPITAL ENCOUNTER (OUTPATIENT)
Dept: ULTRASOUND IMAGING | Facility: HOSPITAL | Age: 69
Discharge: HOME | End: 2024-12-05
Payer: MEDICARE

## 2024-12-05 DIAGNOSIS — R92.8 ABNORMAL MAMMOGRAM OF LEFT BREAST: ICD-10-CM

## 2024-12-05 DIAGNOSIS — R92.8 ABNORMAL MAMMOGRAM: ICD-10-CM

## 2024-12-05 PROCEDURE — 77066 DX MAMMO INCL CAD BI: CPT

## 2024-12-05 PROCEDURE — 25510000001 IOPAMIDOL PER 1 ML: Performed by: FAMILY MEDICINE

## 2024-12-05 PROCEDURE — 76642 ULTRASOUND BREAST LIMITED: CPT

## 2024-12-05 PROCEDURE — G0279 TOMOSYNTHESIS, MAMMO: HCPCS

## 2024-12-05 RX ORDER — IOPAMIDOL 755 MG/ML
100 INJECTION, SOLUTION INTRAVASCULAR
Status: COMPLETED | OUTPATIENT
Start: 2024-12-05 | End: 2024-12-05

## 2024-12-05 RX ADMIN — IOPAMIDOL 100 ML: 755 INJECTION, SOLUTION INTRAVENOUS at 11:09

## 2024-12-18 ENCOUNTER — NURSE ONLY (OUTPATIENT)
Dept: PRIMARY CARE CLINIC | Age: 69
End: 2024-12-18
Payer: MEDICARE

## 2024-12-18 DIAGNOSIS — M81.0 AGE-RELATED OSTEOPOROSIS WITHOUT CURRENT PATHOLOGICAL FRACTURE: ICD-10-CM

## 2024-12-18 DIAGNOSIS — R10.9 FLANK PAIN: Primary | ICD-10-CM

## 2024-12-18 LAB
ALBUMIN SERPL-MCNC: 4.2 G/DL (ref 3.5–5.2)
ALP SERPL-CCNC: 38 U/L (ref 35–104)
ALT SERPL-CCNC: 27 U/L (ref 5–33)
ANION GAP SERPL CALCULATED.3IONS-SCNC: 11 MMOL/L (ref 7–19)
APPEARANCE FLUID: CLEAR
AST SERPL-CCNC: 26 U/L (ref 5–32)
BILIRUB SERPL-MCNC: 0.2 MG/DL (ref 0.2–1.2)
BILIRUBIN, POC: NORMAL
BLOOD URINE, POC: NORMAL
BUN SERPL-MCNC: 18 MG/DL (ref 8–23)
CALCIUM SERPL-MCNC: 8.2 MG/DL (ref 8.8–10.2)
CHLORIDE SERPL-SCNC: 94 MMOL/L (ref 98–111)
CLARITY, POC: CLEAR
CO2 SERPL-SCNC: 29 MMOL/L (ref 22–29)
COLOR, POC: YELLOW
CREAT SERPL-MCNC: 0.7 MG/DL (ref 0.5–0.9)
GLUCOSE SERPL-MCNC: 90 MG/DL (ref 70–99)
GLUCOSE URINE, POC: NORMAL MG/DL
KETONES, POC: NORMAL MG/DL
LEUKOCYTE EST, POC: NORMAL
NITRITE, POC: NORMAL
PH, POC: 6
POTASSIUM SERPL-SCNC: 3.2 MMOL/L (ref 3.5–5)
PROT SERPL-MCNC: 6.8 G/DL (ref 6.4–8.3)
PROTEIN, POC: NORMAL MG/DL
SODIUM SERPL-SCNC: 134 MMOL/L (ref 136–145)
SPECIFIC GRAVITY, POC: 1.01
UROBILINOGEN, POC: 0.2 MG/DL

## 2024-12-18 PROCEDURE — 81002 URINALYSIS NONAUTO W/O SCOPE: CPT | Performed by: FAMILY MEDICINE

## 2024-12-18 NOTE — PROGRESS NOTES
Pt aware of urinalysis result, she did request we do a culture because UA did not show infection the last time she did have a UTI

## 2024-12-19 DIAGNOSIS — M81.0 AGE-RELATED OSTEOPOROSIS WITHOUT CURRENT PATHOLOGICAL FRACTURE: Primary | ICD-10-CM

## 2024-12-19 DIAGNOSIS — E83.51 HYPOCALCEMIA: ICD-10-CM

## 2024-12-19 DIAGNOSIS — E83.51 HYPOCALCEMIA: Primary | ICD-10-CM

## 2024-12-19 LAB — 25(OH)D3 SERPL-MCNC: 95.3 NG/ML

## 2024-12-19 RX ORDER — CALCIUM CARBONATE 10GR (648 MG) 648 MG/1
1 TABLET ORAL 2 TIMES DAILY
Qty: 60 TABLET | Refills: 0 | Status: SHIPPED | OUTPATIENT
Start: 2024-12-19 | End: 2025-01-18

## 2024-12-20 LAB — BACTERIA UR CULT: NORMAL

## 2024-12-27 DIAGNOSIS — E83.51 HYPOCALCEMIA: ICD-10-CM

## 2024-12-27 DIAGNOSIS — M81.0 AGE-RELATED OSTEOPOROSIS WITHOUT CURRENT PATHOLOGICAL FRACTURE: ICD-10-CM

## 2024-12-27 LAB — CALCIUM SERPL-MCNC: 9.5 MG/DL (ref 8.8–10.2)

## 2024-12-31 NOTE — TELEPHONE ENCOUNTER
I would like to put a referral in for her to see ENT since the cough is not better if she is okay with that.   Can you find out if she is okay with that referral?
Pt states she saw you for Cough and Prilosec was given. Pt states it has not helped.
show

## 2025-01-02 ENCOUNTER — HOSPITAL ENCOUNTER (OUTPATIENT)
Dept: INFUSION THERAPY | Age: 70
Setting detail: INFUSION SERIES
Discharge: HOME | End: 2025-01-02
Payer: MEDICARE

## 2025-01-02 VITALS
TEMPERATURE: 96.9 F | RESPIRATION RATE: 18 BRPM | SYSTOLIC BLOOD PRESSURE: 93 MMHG | OXYGEN SATURATION: 95 % | HEART RATE: 79 BPM | DIASTOLIC BLOOD PRESSURE: 41 MMHG

## 2025-01-02 DIAGNOSIS — M81.0 AGE-RELATED OSTEOPOROSIS WITHOUT CURRENT PATHOLOGICAL FRACTURE: Primary | ICD-10-CM

## 2025-01-02 PROCEDURE — 6360000002 HC RX W HCPCS: Performed by: FAMILY MEDICINE

## 2025-01-02 PROCEDURE — 96372 THER/PROPH/DIAG INJ SC/IM: CPT

## 2025-01-02 RX ORDER — EPINEPHRINE 1 MG/ML
0.3 INJECTION, SOLUTION, CONCENTRATE INTRAVENOUS PRN
OUTPATIENT
Start: 2025-06-18

## 2025-01-02 RX ORDER — SODIUM CHLORIDE 9 MG/ML
INJECTION, SOLUTION INTRAVENOUS CONTINUOUS
OUTPATIENT
Start: 2025-06-18

## 2025-01-02 RX ORDER — ONDANSETRON 2 MG/ML
8 INJECTION INTRAMUSCULAR; INTRAVENOUS
OUTPATIENT
Start: 2025-06-18

## 2025-01-02 RX ORDER — DIPHENHYDRAMINE HYDROCHLORIDE 50 MG/ML
50 INJECTION INTRAMUSCULAR; INTRAVENOUS
OUTPATIENT
Start: 2025-06-18

## 2025-01-02 RX ORDER — ALBUTEROL SULFATE 90 UG/1
4 INHALANT RESPIRATORY (INHALATION) PRN
OUTPATIENT
Start: 2025-06-18

## 2025-01-02 RX ORDER — ACETAMINOPHEN 325 MG/1
650 TABLET ORAL
OUTPATIENT
Start: 2025-06-18

## 2025-01-02 RX ORDER — HYDROCORTISONE SODIUM SUCCINATE 100 MG/2ML
100 INJECTION INTRAMUSCULAR; INTRAVENOUS
OUTPATIENT
Start: 2025-06-18

## 2025-01-02 RX ADMIN — DENOSUMAB 60 MG: 60 INJECTION SUBCUTANEOUS at 13:08

## 2025-01-02 NOTE — FLOWSHEET NOTE
01/02/25 1314   AVS Reviewed   AVS & discharge instructions reviewed with patient and/or representative? Yes   Reviewed instructions with Patient   Level of Understanding Questions answered;Verbalized understanding     PROLIA 60MG GIVEN SQ AND PT TOLERATED WELL

## 2025-01-24 RX ORDER — BUPROPION HYDROCHLORIDE 150 MG/1
150 TABLET ORAL EVERY MORNING
Qty: 90 TABLET | Refills: 1 | Status: SHIPPED | OUTPATIENT
Start: 2025-01-24

## 2025-05-19 RX ORDER — LEVOTHYROXINE SODIUM 25 UG/1
25 TABLET ORAL DAILY
Qty: 90 TABLET | Refills: 3 | Status: SHIPPED | OUTPATIENT
Start: 2025-05-19

## 2025-06-03 RX ORDER — NITROFURANTOIN MACROCRYSTALS 50 MG/1
50 CAPSULE ORAL DAILY
Qty: 30 CAPSULE | Refills: 1 | Status: SHIPPED | OUTPATIENT
Start: 2025-06-03

## 2025-06-16 ENCOUNTER — OFFICE VISIT (OUTPATIENT)
Dept: PRIMARY CARE CLINIC | Age: 70
End: 2025-06-16
Payer: MEDICARE

## 2025-06-16 VITALS
BODY MASS INDEX: 19.66 KG/M2 | HEART RATE: 87 BPM | RESPIRATION RATE: 16 BRPM | HEIGHT: 65 IN | SYSTOLIC BLOOD PRESSURE: 102 MMHG | TEMPERATURE: 97.5 F | WEIGHT: 118 LBS | OXYGEN SATURATION: 98 % | DIASTOLIC BLOOD PRESSURE: 74 MMHG

## 2025-06-16 DIAGNOSIS — F41.9 ANXIETY: ICD-10-CM

## 2025-06-16 DIAGNOSIS — Z00.00 MEDICARE ANNUAL WELLNESS VISIT, SUBSEQUENT: Primary | ICD-10-CM

## 2025-06-16 DIAGNOSIS — E03.9 HYPOTHYROIDISM, UNSPECIFIED TYPE: ICD-10-CM

## 2025-06-16 DIAGNOSIS — M81.0 AGE-RELATED OSTEOPOROSIS WITHOUT CURRENT PATHOLOGICAL FRACTURE: ICD-10-CM

## 2025-06-16 PROCEDURE — G8399 PT W/DXA RESULTS DOCUMENT: HCPCS | Performed by: FAMILY MEDICINE

## 2025-06-16 PROCEDURE — 1090F PRES/ABSN URINE INCON ASSESS: CPT | Performed by: FAMILY MEDICINE

## 2025-06-16 PROCEDURE — G8427 DOCREV CUR MEDS BY ELIG CLIN: HCPCS | Performed by: FAMILY MEDICINE

## 2025-06-16 PROCEDURE — 99214 OFFICE O/P EST MOD 30 MIN: CPT | Performed by: FAMILY MEDICINE

## 2025-06-16 PROCEDURE — 1159F MED LIST DOCD IN RCRD: CPT | Performed by: FAMILY MEDICINE

## 2025-06-16 PROCEDURE — G8420 CALC BMI NORM PARAMETERS: HCPCS | Performed by: FAMILY MEDICINE

## 2025-06-16 PROCEDURE — 1036F TOBACCO NON-USER: CPT | Performed by: FAMILY MEDICINE

## 2025-06-16 PROCEDURE — G0439 PPPS, SUBSEQ VISIT: HCPCS | Performed by: FAMILY MEDICINE

## 2025-06-16 PROCEDURE — 3017F COLORECTAL CA SCREEN DOC REV: CPT | Performed by: FAMILY MEDICINE

## 2025-06-16 PROCEDURE — 1123F ACP DISCUSS/DSCN MKR DOCD: CPT | Performed by: FAMILY MEDICINE

## 2025-06-16 RX ORDER — BUPROPION HYDROCHLORIDE 150 MG/1
150 TABLET ORAL EVERY MORNING
Qty: 90 TABLET | Refills: 2 | Status: SHIPPED | OUTPATIENT
Start: 2025-06-16

## 2025-06-16 RX ORDER — MAGNESIUM 30 MG
TABLET ORAL
COMMUNITY

## 2025-06-16 RX ORDER — BUSPIRONE HYDROCHLORIDE 5 MG/1
5 TABLET ORAL 3 TIMES DAILY
COMMUNITY

## 2025-06-16 SDOH — ECONOMIC STABILITY: FOOD INSECURITY: WITHIN THE PAST 12 MONTHS, THE FOOD YOU BOUGHT JUST DIDN'T LAST AND YOU DIDN'T HAVE MONEY TO GET MORE.: NEVER TRUE

## 2025-06-16 SDOH — ECONOMIC STABILITY: FOOD INSECURITY: WITHIN THE PAST 12 MONTHS, YOU WORRIED THAT YOUR FOOD WOULD RUN OUT BEFORE YOU GOT MONEY TO BUY MORE.: NEVER TRUE

## 2025-06-16 ASSESSMENT — PATIENT HEALTH QUESTIONNAIRE - PHQ9
SUM OF ALL RESPONSES TO PHQ QUESTIONS 1-9: 2
SUM OF ALL RESPONSES TO PHQ QUESTIONS 1-9: 2
1. LITTLE INTEREST OR PLEASURE IN DOING THINGS: SEVERAL DAYS
SUM OF ALL RESPONSES TO PHQ QUESTIONS 1-9: 2
SUM OF ALL RESPONSES TO PHQ QUESTIONS 1-9: 2
2. FEELING DOWN, DEPRESSED OR HOPELESS: SEVERAL DAYS

## 2025-06-16 ASSESSMENT — LIFESTYLE VARIABLES
HOW OFTEN DO YOU HAVE A DRINK CONTAINING ALCOHOL: 2-3 TIMES A WEEK
HOW MANY STANDARD DRINKS CONTAINING ALCOHOL DO YOU HAVE ON A TYPICAL DAY: 1 OR 2

## 2025-06-18 NOTE — PROGRESS NOTES
Medicare Annual Wellness Visit    Janet Smith is here for Medicare AWV (Non-fasting.)    Assessment & Plan  1. Wellness visit.  - No recent illness reported, except for sinus-related symptoms.  - Increased pain and limited mobility.  - Continued use of calcium and vitamin D supplements.  - Laboratory tests ordered for completion at her convenience. Prescription refill for Wellbutrin provided.  Medicare annual wellness visit, subsequent  Hypothyroidism, unspecified type  -     CBC with Auto Differential; Future  -     Comprehensive Metabolic Panel; Future  -     Lipid Panel; Future  -     TSH; Future  -     T4, Free; Future  -     Calcium; Future  Age-related osteoporosis without current pathological fracture  -     CBC with Auto Differential; Future  -     Comprehensive Metabolic Panel; Future  -     Lipid Panel; Future  -     TSH; Future  -     T4, Free; Future  -     Calcium; Future  Anxiety    Results       Return in 1 year (on 6/16/2026) for Medicare AWV.     Subjective     History of Present Illness  The patient presents for a wellness visit.    She reported discovering a small, non-tender lump in her neck while in the car yesterday. She has not experienced any difficulty swallowing or recent illness, except for sinus-related issues.    She is scheduled to receive her Prolia injection on 07/03/2025 and plans to have her calcium levels checked a week prior. She continues to take calcium and vitamin D supplements. Her medication regimen remains unchanged, with the exception of Mucinex. She has discontinued caffeine intake, which has resulted in improved energy levels, although she still experiences fatigue. She is currently on Wellbutrin and occasionally takes BuSpar.    SOCIAL HISTORY  She is off caffeine now.    Patient's complete Health Risk Assessment and screening values have been reviewed and are found in Flowsheets. The following problems were reviewed today and where indicated follow up appointments

## 2025-06-27 ENCOUNTER — LAB (OUTPATIENT)
Dept: PRIMARY CARE CLINIC | Age: 70
End: 2025-06-27

## 2025-06-27 DIAGNOSIS — E83.51 HYPOCALCEMIA: ICD-10-CM

## 2025-06-27 DIAGNOSIS — M81.0 AGE-RELATED OSTEOPOROSIS WITHOUT CURRENT PATHOLOGICAL FRACTURE: ICD-10-CM

## 2025-06-27 DIAGNOSIS — E03.9 HYPOTHYROIDISM, UNSPECIFIED TYPE: ICD-10-CM

## 2025-06-27 LAB
25(OH)D3 SERPL-MCNC: 93.7 NG/ML
ALBUMIN SERPL-MCNC: 4.3 G/DL (ref 3.5–5.2)
ALP SERPL-CCNC: 45 U/L (ref 35–104)
ALT SERPL-CCNC: 22 U/L (ref 10–35)
ANION GAP SERPL CALCULATED.3IONS-SCNC: 12 MMOL/L (ref 8–16)
AST SERPL-CCNC: 21 U/L (ref 10–35)
BASOPHILS # BLD: 0 K/UL (ref 0–0.2)
BASOPHILS NFR BLD: 0.5 % (ref 0–1)
BILIRUB SERPL-MCNC: <0.2 MG/DL (ref 0.2–1.2)
BUN SERPL-MCNC: 19 MG/DL (ref 8–23)
CALCIUM SERPL-MCNC: 9.5 MG/DL (ref 8.8–10.2)
CHLORIDE SERPL-SCNC: 101 MMOL/L (ref 98–107)
CHOLEST SERPL-MCNC: 192 MG/DL (ref 0–199)
CO2 SERPL-SCNC: 27 MMOL/L (ref 22–29)
CREAT SERPL-MCNC: 0.8 MG/DL (ref 0.5–0.9)
EOSINOPHIL # BLD: 0.1 K/UL (ref 0–0.6)
EOSINOPHIL NFR BLD: 1.9 % (ref 0–5)
ERYTHROCYTE [DISTWIDTH] IN BLOOD BY AUTOMATED COUNT: 13.1 % (ref 11.5–14.5)
GLUCOSE SERPL-MCNC: 80 MG/DL (ref 70–99)
HCT VFR BLD AUTO: 36.7 % (ref 37–47)
HDLC SERPL-MCNC: 82 MG/DL (ref 40–60)
HGB BLD-MCNC: 12.1 G/DL (ref 12–16)
IMM GRANULOCYTES # BLD: 0 K/UL
LDLC SERPL CALC-MCNC: 101 MG/DL
LYMPHOCYTES # BLD: 1 K/UL (ref 1.1–4.5)
LYMPHOCYTES NFR BLD: 23.6 % (ref 20–40)
MCH RBC QN AUTO: 30.3 PG (ref 27–31)
MCHC RBC AUTO-ENTMCNC: 33 G/DL (ref 33–37)
MCV RBC AUTO: 92 FL (ref 81–99)
MONOCYTES # BLD: 0.4 K/UL (ref 0–0.9)
MONOCYTES NFR BLD: 9.5 % (ref 0–10)
NEUTROPHILS # BLD: 2.7 K/UL (ref 1.5–7.5)
NEUTS SEG NFR BLD: 64.5 % (ref 50–65)
PLATELET # BLD AUTO: 308 K/UL (ref 130–400)
PMV BLD AUTO: 8.6 FL (ref 9.4–12.3)
POTASSIUM SERPL-SCNC: 4.1 MMOL/L (ref 3.5–5.1)
PROT SERPL-MCNC: 7.1 G/DL (ref 6.4–8.3)
RBC # BLD AUTO: 3.99 M/UL (ref 4.2–5.4)
SODIUM SERPL-SCNC: 140 MMOL/L (ref 136–145)
T4 FREE SERPL-MCNC: 0.97 NG/DL (ref 0.93–1.7)
TRIGL SERPL-MCNC: 47 MG/DL (ref 0–149)
TSH SERPL DL<=0.005 MIU/L-ACNC: 3.69 UIU/ML (ref 0.27–4.2)
WBC # BLD AUTO: 4.2 K/UL (ref 4.8–10.8)

## 2025-07-01 ENCOUNTER — RESULTS FOLLOW-UP (OUTPATIENT)
Dept: PRIMARY CARE CLINIC | Age: 70
End: 2025-07-01

## 2025-07-03 ENCOUNTER — HOSPITAL ENCOUNTER (OUTPATIENT)
Dept: INFUSION THERAPY | Age: 70
Setting detail: INFUSION SERIES
Discharge: HOME | End: 2025-07-03
Payer: MEDICARE

## 2025-07-03 VITALS
HEART RATE: 80 BPM | SYSTOLIC BLOOD PRESSURE: 98 MMHG | OXYGEN SATURATION: 100 % | DIASTOLIC BLOOD PRESSURE: 60 MMHG | RESPIRATION RATE: 17 BRPM

## 2025-07-03 DIAGNOSIS — M81.0 AGE-RELATED OSTEOPOROSIS WITHOUT CURRENT PATHOLOGICAL FRACTURE: Primary | ICD-10-CM

## 2025-07-03 PROCEDURE — 6360000002 HC RX W HCPCS: Performed by: FAMILY MEDICINE

## 2025-07-03 PROCEDURE — 96372 THER/PROPH/DIAG INJ SC/IM: CPT

## 2025-07-03 RX ORDER — EPINEPHRINE 1 MG/ML
0.3 INJECTION, SOLUTION, CONCENTRATE INTRAVENOUS PRN
OUTPATIENT
Start: 2025-07-03

## 2025-07-03 RX ORDER — ACETAMINOPHEN 325 MG/1
650 TABLET ORAL
OUTPATIENT
Start: 2025-07-03

## 2025-07-03 RX ORDER — ONDANSETRON 2 MG/ML
8 INJECTION INTRAMUSCULAR; INTRAVENOUS
OUTPATIENT
Start: 2025-07-03

## 2025-07-03 RX ORDER — ALBUTEROL SULFATE 90 UG/1
4 INHALANT RESPIRATORY (INHALATION) PRN
OUTPATIENT
Start: 2025-07-03

## 2025-07-03 RX ORDER — SODIUM CHLORIDE 9 MG/ML
INJECTION, SOLUTION INTRAVENOUS CONTINUOUS
OUTPATIENT
Start: 2025-07-03

## 2025-07-03 RX ORDER — HYDROCORTISONE SODIUM SUCCINATE 100 MG/2ML
100 INJECTION INTRAMUSCULAR; INTRAVENOUS
OUTPATIENT
Start: 2025-07-03

## 2025-07-03 RX ORDER — DIPHENHYDRAMINE HYDROCHLORIDE 50 MG/ML
50 INJECTION, SOLUTION INTRAMUSCULAR; INTRAVENOUS
OUTPATIENT
Start: 2025-07-03

## 2025-07-03 RX ADMIN — DENOSUMAB 60 MG: 60 INJECTION SUBCUTANEOUS at 14:40

## 2025-07-03 NOTE — DISCHARGE INSTRUCTIONS
Denosumab Injection  (den oh' dennise mab)  Brand Name(s): Jubbonti® (denosumab-bbdz), Prolia®, Wyost® (denosumab-bbdz), Xgeva®  IMPORTANT WARNING:  Denosumab injection products may cause serious or life-threatening low calcium levels in your blood. Tell your doctor if you have or have ever had low levels of calcium in your blood. Your doctor will probably check the level of calcium in your blood before you begin treatment and will probably tell you not to receive a denosumab injection product if the level is too low. Also, tell your doctor if you are receiving dialysis treatments or are taking medications to lower your calcium levels or if you have or have ever had kidney problems; hypoparathyroidism (condition in which the body does not produce enough parathyroid hormone (PTH; a natural substance needed to control the amount of calcium in the blood)); malabsorption problems (problems with your stomach or intestine that make it difficult for your body to absorb nutrients); surgery on your thyroid gland or parathyroid gland (small gland in the neck); or surgery to remove part of your small intestine. Your doctor will monitor your blood calcium levels during your treatment. If you experience any of the following symptoms during your treatment, call your doctor immediately: numbness or tingling in your fingers, toes, or around your mouth; or muscle stiffness, twitching, cramps, or spasms.  Your doctor or pharmacist will give you the 's patient information sheet (Medication Guide) when you begin treatment with denosumab injection products. Read the information carefully and ask your doctor or pharmacist if you have any questions. You can also visit the Food and Drug Administration (FDA) website (http://www.fda.gov/Drugs/DrugSafety/cnp567794.htm) (or the 's website) to obtain the Medication Guide.  Talk to your doctor about the risks of receiving denosumab injection products.  IMPORTANT  levels that are caused by cancer in people who did not respond to other medications.  Denosumab injection is in a class of medications called RANK ligand inhibitors. It works to prevent bone loss by blocking a certain receptor in the body to decrease bone breakdown. It works to treat GCTB by blocking a certain receptor in the tumor cells which slows the tumor growth. It works to treat high calcium levels by decreasing bone breakdown as the breakdown of bones releases calcium.  Are there OTHER USES for this medicine?  This medication may be prescribed for other uses; ask your doctor or pharmacist for more information.  HOW should this medicine be used?  Denosumab injection products come as a solution (liquid) to be injected subcutaneously (under the skin) in your upper arm, upper thigh, or stomach area. It is usually injected by a doctor or nurse in a medical office or clinic. When a denosumab injection product (Prolia, Jubbonti) is used to treat osteoporosis or bone loss, it is usually given once every 6 months. When a denosumab injection product (Wyost, Xgeva) is used to reduce the risk of fractures from multiple myeloma, or cancer that has spread to the bones, it is usually given once every 4 weeks. When a denosumab injection product (Wyost, Xgeva) is used to treat giant cell tumor of bone, or high calcium levels caused by cancer, it is usually given every 7 days for the first three doses (on day 1, day 8, and day 15) and then once every 4 weeks starting 2 weeks after the first three doses.  Your doctor will tell you to take supplements of calcium and vitamin D while you are being treated with denosumab injection products. Take these supplements exactly as directed.  Ask your pharmacist or doctor for a copy of the 's information for the patient.  What SPECIAL PRECAUTIONS should I follow?  Before receiving denosumab injection products,  tell your doctor and pharmacist if you are allergic to denosumab

## 2025-07-03 NOTE — PROGRESS NOTES
After obtaining consent, and per orders of Dr. Giselle Pan, injection of Prolia 60 mg SC given in Left arm by Sharlene Abraham RN. Patient instructed to report any adverse reaction to me immediately. Pt tolerated well.

## (undated) DEVICE — ENDO KIT,LOURDES HOSPITAL: Brand: MEDLINE INDUSTRIES, INC.